# Patient Record
Sex: MALE | Race: WHITE | NOT HISPANIC OR LATINO | Employment: FULL TIME | URBAN - METROPOLITAN AREA
[De-identification: names, ages, dates, MRNs, and addresses within clinical notes are randomized per-mention and may not be internally consistent; named-entity substitution may affect disease eponyms.]

---

## 2023-07-31 ENCOUNTER — APPOINTMENT (OUTPATIENT)
Dept: PRIMARY CARE | Facility: CLINIC | Age: 63
End: 2023-07-31
Payer: COMMERCIAL

## 2023-08-07 ENCOUNTER — OFFICE VISIT (OUTPATIENT)
Dept: PRIMARY CARE | Facility: CLINIC | Age: 63
End: 2023-08-07
Payer: COMMERCIAL

## 2023-08-07 VITALS
HEIGHT: 71 IN | RESPIRATION RATE: 16 BRPM | HEART RATE: 77 BPM | SYSTOLIC BLOOD PRESSURE: 136 MMHG | DIASTOLIC BLOOD PRESSURE: 90 MMHG | WEIGHT: 236.7 LBS | TEMPERATURE: 98 F | OXYGEN SATURATION: 98 % | BODY MASS INDEX: 33.14 KG/M2

## 2023-08-07 DIAGNOSIS — E55.9 VITAMIN D DEFICIENCY: ICD-10-CM

## 2023-08-07 DIAGNOSIS — Z00.00 MEDICARE ANNUAL WELLNESS VISIT, INITIAL: Primary | ICD-10-CM

## 2023-08-07 DIAGNOSIS — E11.9 TYPE 2 DIABETES MELLITUS WITHOUT COMPLICATION, WITHOUT LONG-TERM CURRENT USE OF INSULIN (MULTI): ICD-10-CM

## 2023-08-07 DIAGNOSIS — R97.20 ELEVATED PSA: ICD-10-CM

## 2023-08-07 DIAGNOSIS — Z23 NEED FOR VACCINATION: ICD-10-CM

## 2023-08-07 DIAGNOSIS — Z12.11 SCREENING FOR COLORECTAL CANCER: ICD-10-CM

## 2023-08-07 DIAGNOSIS — Z12.12 SCREENING FOR COLORECTAL CANCER: ICD-10-CM

## 2023-08-07 DIAGNOSIS — Z00.00 ROUTINE GENERAL MEDICAL EXAMINATION AT HEALTH CARE FACILITY: ICD-10-CM

## 2023-08-07 DIAGNOSIS — R35.0 BENIGN PROSTATIC HYPERPLASIA WITH URINARY FREQUENCY: ICD-10-CM

## 2023-08-07 DIAGNOSIS — N40.1 BENIGN PROSTATIC HYPERPLASIA WITH URINARY FREQUENCY: ICD-10-CM

## 2023-08-07 PROBLEM — R73.9 BLOOD GLUCOSE ELEVATED: Status: ACTIVE | Noted: 2023-08-07

## 2023-08-07 PROBLEM — R73.9 HYPERGLYCEMIA: Status: ACTIVE | Noted: 2023-08-07

## 2023-08-07 PROBLEM — M17.11 PRIMARY OSTEOARTHRITIS OF RIGHT KNEE: Status: ACTIVE | Noted: 2023-08-07

## 2023-08-07 PROCEDURE — 3046F HEMOGLOBIN A1C LEVEL >9.0%: CPT | Performed by: INTERNAL MEDICINE

## 2023-08-07 PROCEDURE — 90472 IMMUNIZATION ADMIN EACH ADD: CPT | Performed by: INTERNAL MEDICINE

## 2023-08-07 PROCEDURE — 3080F DIAST BP >= 90 MM HG: CPT | Performed by: INTERNAL MEDICINE

## 2023-08-07 PROCEDURE — 3075F SYST BP GE 130 - 139MM HG: CPT | Performed by: INTERNAL MEDICINE

## 2023-08-07 PROCEDURE — G0402 INITIAL PREVENTIVE EXAM: HCPCS | Performed by: INTERNAL MEDICINE

## 2023-08-07 PROCEDURE — 1036F TOBACCO NON-USER: CPT | Performed by: INTERNAL MEDICINE

## 2023-08-07 PROCEDURE — 1170F FXNL STATUS ASSESSED: CPT | Performed by: INTERNAL MEDICINE

## 2023-08-07 PROCEDURE — 90471 IMMUNIZATION ADMIN: CPT | Performed by: INTERNAL MEDICINE

## 2023-08-07 PROCEDURE — 1159F MED LIST DOCD IN RCRD: CPT | Performed by: INTERNAL MEDICINE

## 2023-08-07 PROCEDURE — 90677 PCV20 VACCINE IM: CPT | Performed by: INTERNAL MEDICINE

## 2023-08-07 PROCEDURE — 90715 TDAP VACCINE 7 YRS/> IM: CPT | Performed by: INTERNAL MEDICINE

## 2023-08-07 RX ORDER — BLOOD SUGAR DIAGNOSTIC
STRIP MISCELLANEOUS
COMMUNITY
End: 2023-11-10 | Stop reason: SDUPTHER

## 2023-08-07 RX ORDER — LANCETS
EACH MISCELLANEOUS
COMMUNITY
Start: 2022-12-15 | End: 2024-02-26 | Stop reason: SDUPTHER

## 2023-08-07 RX ORDER — TAMSULOSIN HYDROCHLORIDE 0.4 MG/1
0.4 CAPSULE ORAL DAILY
Qty: 30 CAPSULE | Refills: 11 | Status: SHIPPED | OUTPATIENT
Start: 2023-08-07 | End: 2023-08-09

## 2023-08-07 RX ORDER — BLOOD-GLUCOSE METER
EACH MISCELLANEOUS
COMMUNITY
Start: 2022-09-19

## 2023-08-07 ASSESSMENT — PATIENT HEALTH QUESTIONNAIRE - PHQ9
1. LITTLE INTEREST OR PLEASURE IN DOING THINGS: NOT AT ALL
2. FEELING DOWN, DEPRESSED OR HOPELESS: NOT AT ALL
SUM OF ALL RESPONSES TO PHQ9 QUESTIONS 1 AND 2: 0

## 2023-08-07 ASSESSMENT — ENCOUNTER SYMPTOMS
OCCASIONAL FEELINGS OF UNSTEADINESS: 0
DEPRESSION: 0
LOSS OF SENSATION IN FEET: 0

## 2023-08-07 ASSESSMENT — ACTIVITIES OF DAILY LIVING (ADL)
BATHING: INDEPENDENT
MANAGING_FINANCES: INDEPENDENT
DOING_HOUSEWORK: INDEPENDENT
DRESSING: INDEPENDENT
TAKING_MEDICATION: INDEPENDENT
GROCERY_SHOPPING: INDEPENDENT

## 2023-08-07 NOTE — PROGRESS NOTES
Subjective   Patient ID: Shoaib Cheng is a 63 y.o. male who presents for Medicare Annual Wellness Visit Initial.     HAVE SOME DIABETES THING TALKED ABOUT IN THE PAST, CHANGED DIET AND LOST WEIGHT    ONLY ISSUE IS THAT SOME NIGHTS GET UP FREQUENTLY TO URINATE.    SOME TIMES THE FLOW.  NOT SEEING A UROLOGIST ANY MORE.  URINARY FREQUENCY WHEN STANDING UP.  DON'T TAKE SILDENAFIL ANY MORE                Review of Systems   Constitutional:  Negative for chills, diaphoresis and fever.   Respiratory:  Negative for cough and shortness of breath.    Cardiovascular:  Negative for chest pain and leg swelling.   Gastrointestinal:  Negative for constipation, diarrhea, nausea and vomiting.   Musculoskeletal:  Negative for joint swelling and myalgias.   All other systems reviewed and are negative.      Objective   There were no vitals taken for this visit.    Physical Exam  Vitals reviewed.   Constitutional:       General: He is not in acute distress.     Appearance: He is not ill-appearing.   Cardiovascular:      Rate and Rhythm: Normal rate and regular rhythm.      Pulses: Normal pulses.      Heart sounds:      No gallop.   Pulmonary:      Breath sounds: Normal breath sounds. No wheezing, rhonchi or rales.   Abdominal:      General: Abdomen is flat. Bowel sounds are normal.      Palpations: Abdomen is soft.      Tenderness: There is no guarding or rebound.   Musculoskeletal:      Right lower leg: No edema.      Left lower leg: No edema.         Assessment/Plan   Problem List Items Addressed This Visit       Elevated PSA    Relevant Orders    PSA, total and free    Type 2 diabetes mellitus without complication, without long-term current use of insulin (CMS/[facility])    Relevant Orders    Vitamin B12 (Completed)    Lipid Panel (Completed)    TSH with reflex to Free T4 if abnormal (Completed)    Comprehensive Metabolic Panel (Completed)    CBC (Completed)    Hemoglobin A1C (Completed)     Other Visit Diagnoses       Medicare annual  wellness visit, initial    -  Primary    Relevant Orders    Hepatitis C Antibody (Completed)    Pneumococcal conjugate vaccine, 20-valent, adult (PREVNAR 20) (Completed)    Tdap vaccine, age 10 years and older  (BOOSTRIX) (Completed)    Benign prostatic hyperplasia with urinary frequency        Relevant Medications    tamsulosin (Flomax) 0.4 mg 24 hr capsule    Vitamin D deficiency        Relevant Orders    Vitamin D, Total (Completed)    Need for vaccination        Screening for colorectal cancer        Relevant Orders    Colonoscopy    Routine general medical examination at health care facility        Relevant Orders    Pneumococcal conjugate vaccine, 20-valent, adult (PREVNAR 20) (Completed)    Tdap vaccine, age 10 years and older  (BOOSTRIX) (Completed)          Patient Instructions    PREVNAR-20 PNEUMONIA IMMUNIZATION AND TETANUS/PERTUSSIS IMMUNIZATIONS ARE ADMINISTERED TO YOU TODAY    2.  SHINGRIX IMMUNIZATION SCRIPT PRINTED TO TAKE TO THE PHARMACY TO GET THAT    3.  COLONSOCOPY REFERRAL IS MADE FOR YOU    4.  TAMSULOSIN 0.4 MG DAILY FOR SYMPTOMS OF PROSTATE ENLARGEMENT, PLEASE USE CAUTIOUSLY WITH SILDENAFIL    5.  REGULAR DENTAL AND EYE CHECKS AS YOU ARE DOING    6.  A1C TO BE DONE WITH BLOOD TEST WILL GUAGE HOW WELL YOU ARE DOING WITH DIABETES    7.  6 MONTH FOLLOW UP FOR DIABETES IS RECOMMENDED    8.  REGULAR DIET, EXERCISE, AND WEIGHT LOSS IS RECOMMENDED FOR YOU AS FOR ALL

## 2023-08-08 ENCOUNTER — LAB (OUTPATIENT)
Dept: LAB | Facility: LAB | Age: 63
End: 2023-08-08
Payer: COMMERCIAL

## 2023-08-08 DIAGNOSIS — E55.9 VITAMIN D DEFICIENCY: ICD-10-CM

## 2023-08-08 DIAGNOSIS — E11.9 TYPE 2 DIABETES MELLITUS WITHOUT COMPLICATION, WITHOUT LONG-TERM CURRENT USE OF INSULIN (MULTI): ICD-10-CM

## 2023-08-08 DIAGNOSIS — Z00.00 MEDICARE ANNUAL WELLNESS VISIT, INITIAL: ICD-10-CM

## 2023-08-08 DIAGNOSIS — R97.20 ELEVATED PSA: ICD-10-CM

## 2023-08-08 LAB
ALANINE AMINOTRANSFERASE (SGPT) (U/L) IN SER/PLAS: 18 U/L (ref 10–52)
ALBUMIN (G/DL) IN SER/PLAS: 4.2 G/DL (ref 3.4–5)
ALKALINE PHOSPHATASE (U/L) IN SER/PLAS: 76 U/L (ref 33–136)
ANION GAP IN SER/PLAS: 12 MMOL/L (ref 10–20)
ASPARTATE AMINOTRANSFERASE (SGOT) (U/L) IN SER/PLAS: 12 U/L (ref 9–39)
BILIRUBIN TOTAL (MG/DL) IN SER/PLAS: 0.4 MG/DL (ref 0–1.2)
CALCIDIOL (25 OH VITAMIN D3) (NG/ML) IN SER/PLAS: 26 NG/ML
CALCIUM (MG/DL) IN SER/PLAS: 9.4 MG/DL (ref 8.6–10.3)
CARBON DIOXIDE, TOTAL (MMOL/L) IN SER/PLAS: 26 MMOL/L (ref 21–32)
CHLORIDE (MMOL/L) IN SER/PLAS: 103 MMOL/L (ref 98–107)
CHOLESTEROL (MG/DL) IN SER/PLAS: 209 MG/DL (ref 0–199)
CHOLESTEROL IN HDL (MG/DL) IN SER/PLAS: 35.1 MG/DL
CHOLESTEROL/HDL RATIO: 6
COBALAMIN (VITAMIN B12) (PG/ML) IN SER/PLAS: 219 PG/ML (ref 211–911)
CREATININE (MG/DL) IN SER/PLAS: 1.06 MG/DL (ref 0.5–1.3)
ERYTHROCYTE DISTRIBUTION WIDTH (RATIO) BY AUTOMATED COUNT: 12.1 % (ref 11.5–14.5)
ERYTHROCYTE MEAN CORPUSCULAR HEMOGLOBIN CONCENTRATION (G/DL) BY AUTOMATED: 33.6 G/DL (ref 32–36)
ERYTHROCYTE MEAN CORPUSCULAR VOLUME (FL) BY AUTOMATED COUNT: 93 FL (ref 80–100)
ERYTHROCYTES (10*6/UL) IN BLOOD BY AUTOMATED COUNT: 4.67 X10E12/L (ref 4.5–5.9)
ESTIMATED AVERAGE GLUCOSE FOR HBA1C: 240 MG/DL
GFR MALE: 78 ML/MIN/1.73M2
GLUCOSE (MG/DL) IN SER/PLAS: 273 MG/DL (ref 74–99)
HEMATOCRIT (%) IN BLOOD BY AUTOMATED COUNT: 43.4 % (ref 41–52)
HEMOGLOBIN (G/DL) IN BLOOD: 14.6 G/DL (ref 13.5–17.5)
HEMOGLOBIN A1C/HEMOGLOBIN TOTAL IN BLOOD: 10 %
HEPATITIS C VIRUS AB PRESENCE IN SERUM: NONREACTIVE
LDL: 117 MG/DL (ref 0–99)
LEUKOCYTES (10*3/UL) IN BLOOD BY AUTOMATED COUNT: 10.9 X10E9/L (ref 4.4–11.3)
NON HDL CHOLESTEROL: 174 MG/DL
PLATELETS (10*3/UL) IN BLOOD AUTOMATED COUNT: 251 X10E9/L (ref 150–450)
POTASSIUM (MMOL/L) IN SER/PLAS: 4.2 MMOL/L (ref 3.5–5.3)
PROTEIN TOTAL: 7.1 G/DL (ref 6.4–8.2)
SODIUM (MMOL/L) IN SER/PLAS: 137 MMOL/L (ref 136–145)
THYROTROPIN (MIU/L) IN SER/PLAS BY DETECTION LIMIT <= 0.05 MIU/L: 2.23 MIU/L (ref 0.44–3.98)
TRIGLYCERIDE (MG/DL) IN SER/PLAS: 286 MG/DL (ref 0–149)
UREA NITROGEN (MG/DL) IN SER/PLAS: 22 MG/DL (ref 6–23)
VLDL: 57 MG/DL (ref 0–40)

## 2023-08-08 PROCEDURE — 84154 ASSAY OF PSA FREE: CPT

## 2023-08-08 PROCEDURE — 80053 COMPREHEN METABOLIC PANEL: CPT

## 2023-08-08 PROCEDURE — 83036 HEMOGLOBIN GLYCOSYLATED A1C: CPT

## 2023-08-08 PROCEDURE — 85027 COMPLETE CBC AUTOMATED: CPT

## 2023-08-08 PROCEDURE — 84443 ASSAY THYROID STIM HORMONE: CPT

## 2023-08-08 PROCEDURE — 82607 VITAMIN B-12: CPT

## 2023-08-08 PROCEDURE — 82306 VITAMIN D 25 HYDROXY: CPT

## 2023-08-08 PROCEDURE — 36415 COLL VENOUS BLD VENIPUNCTURE: CPT

## 2023-08-08 PROCEDURE — 80061 LIPID PANEL: CPT

## 2023-08-08 PROCEDURE — 86803 HEPATITIS C AB TEST: CPT

## 2023-08-08 PROCEDURE — 84153 ASSAY OF PSA TOTAL: CPT

## 2023-08-09 DIAGNOSIS — R35.0 BENIGN PROSTATIC HYPERPLASIA WITH URINARY FREQUENCY: ICD-10-CM

## 2023-08-09 DIAGNOSIS — N40.1 BENIGN PROSTATIC HYPERPLASIA WITH URINARY FREQUENCY: ICD-10-CM

## 2023-08-09 RX ORDER — TAMSULOSIN HYDROCHLORIDE 0.4 MG/1
0.4 CAPSULE ORAL DAILY
Qty: 90 CAPSULE | Refills: 11 | Status: ON HOLD | OUTPATIENT
Start: 2023-08-09 | End: 2023-10-17

## 2023-08-09 ASSESSMENT — ENCOUNTER SYMPTOMS
CONSTIPATION: 0
VOMITING: 0
JOINT SWELLING: 0
CHILLS: 0
COUGH: 0
DIAPHORESIS: 0
SHORTNESS OF BREATH: 0
NAUSEA: 0
MYALGIAS: 0
FEVER: 0
DIARRHEA: 0

## 2023-08-11 LAB
PROSTATE SPECIFIC AG (NG/ML) IN SER/PLAS: 7.6 NG/ML (ref 0–4)
PROSTATE SPECIFIC AG FREE (NG/ML) IN SER/PLAS: 1.4 NG/ML
PROSTATE SPECIFIC AG FREE/PROSTATE SPECIFIC AG TOTAL IN SER/PLAS: 18 %

## 2023-08-22 DIAGNOSIS — E11.9 TYPE 2 DIABETES MELLITUS WITHOUT COMPLICATION, WITHOUT LONG-TERM CURRENT USE OF INSULIN (MULTI): Primary | ICD-10-CM

## 2023-08-24 ENCOUNTER — OFFICE VISIT (OUTPATIENT)
Dept: PRIMARY CARE | Facility: CLINIC | Age: 63
End: 2023-08-24
Payer: COMMERCIAL

## 2023-08-24 VITALS
SYSTOLIC BLOOD PRESSURE: 122 MMHG | WEIGHT: 229 LBS | DIASTOLIC BLOOD PRESSURE: 80 MMHG | HEIGHT: 71 IN | RESPIRATION RATE: 14 BRPM | OXYGEN SATURATION: 98 % | BODY MASS INDEX: 32.06 KG/M2 | HEART RATE: 75 BPM

## 2023-08-24 DIAGNOSIS — R97.20 ELEVATED PSA: ICD-10-CM

## 2023-08-24 DIAGNOSIS — E11.9 TYPE 2 DIABETES MELLITUS WITHOUT COMPLICATION, WITHOUT LONG-TERM CURRENT USE OF INSULIN (MULTI): Primary | ICD-10-CM

## 2023-08-24 PROCEDURE — 99214 OFFICE O/P EST MOD 30 MIN: CPT | Performed by: INTERNAL MEDICINE

## 2023-08-24 PROCEDURE — 1159F MED LIST DOCD IN RCRD: CPT | Performed by: INTERNAL MEDICINE

## 2023-08-24 PROCEDURE — 1036F TOBACCO NON-USER: CPT | Performed by: INTERNAL MEDICINE

## 2023-08-24 PROCEDURE — 3079F DIAST BP 80-89 MM HG: CPT | Performed by: INTERNAL MEDICINE

## 2023-08-24 PROCEDURE — 3074F SYST BP LT 130 MM HG: CPT | Performed by: INTERNAL MEDICINE

## 2023-08-24 PROCEDURE — 3046F HEMOGLOBIN A1C LEVEL >9.0%: CPT | Performed by: INTERNAL MEDICINE

## 2023-08-24 RX ORDER — METFORMIN HYDROCHLORIDE 750 MG/1
750 TABLET, EXTENDED RELEASE ORAL
Qty: 30 TABLET | Refills: 11 | Status: SHIPPED | OUTPATIENT
Start: 2023-08-24 | End: 2023-10-19 | Stop reason: HOSPADM

## 2023-08-24 ASSESSMENT — ENCOUNTER SYMPTOMS
NAUSEA: 0
CONSTIPATION: 0
FEVER: 0
COUGH: 0
MYALGIAS: 0
CHILLS: 0
JOINT SWELLING: 0
DIARRHEA: 0
VOMITING: 0
SHORTNESS OF BREATH: 0
DIAPHORESIS: 0

## 2023-08-24 NOTE — PATIENT INSTRUCTIONS
WILL BEGIN METFORMIN  MG DAILY TO ASSIST IN LOWERING YOU A1C    2.  DIET/EXERCISE/WEIGHT LOSS ALONG WITH THE METFORMIN SHOULD HELP YOU LOWER A1C TO THE GOAL WHICH IS <7.0%.  A1C IS CURRENTLY 10.0% WHICH TRANSLATES TO AN ESTIMATED AVERAGE GLUCOSE     3.  RECOMMEND THAT YOU WAIT UNTIL A1C IS CLOSER TO 7.0 BEFORE YOU GET YOUR EYES REFRACTED BY THE EYE DOCTOR FOR GLASSES    4.  CONTINUOUS GLUCOSE MONITORS ARE CURRENTLY RELEGATED FOR THOSE TAKING INSULIN REGULARLY, BUT IF YOU'D LIKE TO PURCHASE ONE AND NEED A SCRIPT I CAN SEND IN FOR YOU    5.  PLEASE CALL IF YOU WOULD LIKE DIETICIAN CONSULT    6.  FOLLOW UP 4-6 MONTHS FOR RECHECK A1C    7.  EVENTUALLY WILL NEED ESTABLISH WITH UROLOGY FOR ONGOING MONITORING OF BPH WITH ELEVATED PSA.

## 2023-08-24 NOTE — PROGRESS NOTES
"Subjective   Shoaib Cheng is a 63 y.o. male who presents for DISCUSS ELEVATED A1c       HPI   BEEN WATCHING DIET SINCE FOUND OUT ABOUT A1C    LOST SOME WEIGHT    WIFE IS PARTICIPATING IN LIFESTYLE CHANGE    BOTH ARE VERY FAMILIAR WITH DIABETES AS THEIR SON IS A TYPE 1 DIABETIC    NO POLYURIA/POLYDIPSIA    QUESTION UTILITY OF A CGM IN HIS CASE?    HAS HAD TWO PROSTATE BIOPSIES NEGATIVE IN THE PAST.        Review of Systems   Constitutional:  Negative for chills, diaphoresis and fever.   Respiratory:  Negative for cough and shortness of breath.    Cardiovascular:  Negative for chest pain and leg swelling.   Gastrointestinal:  Negative for constipation, diarrhea, nausea and vomiting.   Musculoskeletal:  Negative for joint swelling and myalgias.       Objective   /80   Pulse 75   Resp 14   Ht 1.81 m (5' 11.26\")   Wt 104 kg (229 lb)   SpO2 98%   BMI 31.71 kg/m²     Physical Exam  Vitals reviewed.   Constitutional:       General: He is not in acute distress.     Appearance: He is not ill-appearing.   Cardiovascular:      Rate and Rhythm: Normal rate and regular rhythm.      Pulses: Normal pulses.      Heart sounds:      No gallop.   Pulmonary:      Breath sounds: Normal breath sounds. No wheezing, rhonchi or rales.   Abdominal:      General: Abdomen is flat. Bowel sounds are normal.      Palpations: Abdomen is soft.      Tenderness: There is no guarding or rebound.   Musculoskeletal:      Right lower leg: No edema.      Left lower leg: No edema.         Assessment/Plan   Problem List Items Addressed This Visit       Elevated PSA    Type 2 diabetes mellitus without complication, without long-term current use of insulin (CMS/[facility]) - Primary    Relevant Medications    metFORMIN XR (Glucophage-XR) 750 mg 24 hr tablet     Patient Instructions    WILL BEGIN METFORMIN  MG DAILY TO ASSIST IN LOWERING YOU A1C    2.  DIET/EXERCISE/WEIGHT LOSS ALONG WITH THE METFORMIN SHOULD HELP YOU LOWER A1C TO THE GOAL WHICH IS " <7.0%.  A1C IS CURRENTLY 10.0% WHICH TRANSLATES TO AN ESTIMATED AVERAGE GLUCOSE     3.  RECOMMEND THAT YOU WAIT UNTIL A1C IS CLOSER TO 7.0 BEFORE YOU GET YOUR EYES REFRACTED BY THE EYE DOCTOR FOR GLASSES    4.  CONTINUOUS GLUCOSE MONITORS ARE CURRENTLY RELEGATED FOR THOSE TAKING INSULIN REGULARLY, BUT IF YOU'D LIKE TO PURCHASE ONE AND NEED A SCRIPT I CAN SEND IN FOR YOU    5.  PLEASE CALL IF YOU WOULD LIKE DIETICIAN CONSULT    6.  FOLLOW UP 4-6 MONTHS FOR RECHECK A1C    7.  EVENTUALLY WILL NEED ESTABLISH WITH UROLOGY FOR ONGOING MONITORING OF BPH WITH ELEVATED PSA.

## 2023-10-10 ENCOUNTER — TELEPHONE (OUTPATIENT)
Dept: PRIMARY CARE | Facility: CLINIC | Age: 63
End: 2023-10-10
Payer: COMMERCIAL

## 2023-10-14 ENCOUNTER — HOSPITAL ENCOUNTER (OUTPATIENT)
Dept: RADIOLOGY | Facility: EXTERNAL LOCATION | Age: 63
Discharge: HOME | End: 2023-10-14
Payer: COMMERCIAL

## 2023-10-14 DIAGNOSIS — M79.662 PAIN OF LEFT LOWER LEG: ICD-10-CM

## 2023-10-16 ENCOUNTER — APPOINTMENT (OUTPATIENT)
Dept: RADIOLOGY | Facility: HOSPITAL | Age: 63
End: 2023-10-16
Payer: COMMERCIAL

## 2023-10-16 ENCOUNTER — HOSPITAL ENCOUNTER (INPATIENT)
Facility: HOSPITAL | Age: 63
LOS: 1 days | Discharge: HOME | DRG: 638 | End: 2023-10-19
Attending: EMERGENCY MEDICINE | Admitting: INTERNAL MEDICINE
Payer: COMMERCIAL

## 2023-10-16 ENCOUNTER — APPOINTMENT (OUTPATIENT)
Dept: RADIOLOGY | Facility: HOSPITAL | Age: 63
DRG: 638 | End: 2023-10-16
Payer: COMMERCIAL

## 2023-10-16 DIAGNOSIS — L03.116 CELLULITIS AND ABSCESS OF LEFT LEG: Primary | ICD-10-CM

## 2023-10-16 DIAGNOSIS — L02.416 ABSCESS OF LEFT LEG: ICD-10-CM

## 2023-10-16 DIAGNOSIS — R73.9 BLOOD GLUCOSE ELEVATED: ICD-10-CM

## 2023-10-16 DIAGNOSIS — E11.9 TYPE 2 DIABETES MELLITUS WITHOUT COMPLICATION, WITHOUT LONG-TERM CURRENT USE OF INSULIN (MULTI): ICD-10-CM

## 2023-10-16 DIAGNOSIS — R73.9 HYPERGLYCEMIA: ICD-10-CM

## 2023-10-16 DIAGNOSIS — L02.416 CELLULITIS AND ABSCESS OF LEFT LEG: Primary | ICD-10-CM

## 2023-10-16 DIAGNOSIS — F51.01 PRIMARY INSOMNIA: ICD-10-CM

## 2023-10-16 LAB
ALBUMIN SERPL BCP-MCNC: 3.9 G/DL (ref 3.4–5)
ALP SERPL-CCNC: 305 U/L (ref 33–136)
ALT SERPL W P-5'-P-CCNC: 67 U/L (ref 10–52)
ANION GAP SERPL CALC-SCNC: 13 MMOL/L (ref 10–20)
APTT PPP: 32 SECONDS (ref 27–38)
AST SERPL W P-5'-P-CCNC: 26 U/L (ref 9–39)
BASOPHILS # BLD AUTO: 0.17 X10*3/UL (ref 0–0.1)
BASOPHILS NFR BLD AUTO: 0.8 %
BILIRUB SERPL-MCNC: 0.7 MG/DL (ref 0–1.2)
BUN SERPL-MCNC: 17 MG/DL (ref 6–23)
CALCIUM SERPL-MCNC: 9 MG/DL (ref 8.6–10.3)
CHLORIDE SERPL-SCNC: 95 MMOL/L (ref 98–107)
CO2 SERPL-SCNC: 26 MMOL/L (ref 21–32)
CREAT SERPL-MCNC: 0.83 MG/DL (ref 0.5–1.3)
EOSINOPHIL # BLD AUTO: 0.23 X10*3/UL (ref 0–0.7)
EOSINOPHIL NFR BLD AUTO: 1.1 %
ERYTHROCYTE [DISTWIDTH] IN BLOOD BY AUTOMATED COUNT: 12.2 % (ref 11.5–14.5)
GFR SERPL CREATININE-BSD FRML MDRD: >90 ML/MIN/1.73M*2
GLUCOSE SERPL-MCNC: 367 MG/DL (ref 74–99)
HCT VFR BLD AUTO: 36.4 % (ref 41–52)
HGB BLD-MCNC: 12.8 G/DL (ref 13.5–17.5)
IMM GRANULOCYTES # BLD AUTO: 0.63 X10*3/UL (ref 0–0.7)
IMM GRANULOCYTES NFR BLD AUTO: 2.9 % (ref 0–0.9)
INR PPP: 1.4 (ref 0.9–1.1)
LACTATE SERPL-SCNC: 1.7 MMOL/L (ref 0.4–2)
LYMPHOCYTES # BLD AUTO: 2.46 X10*3/UL (ref 1.2–4.8)
LYMPHOCYTES NFR BLD AUTO: 11.4 %
MCH RBC QN AUTO: 31.5 PG (ref 26–34)
MCHC RBC AUTO-ENTMCNC: 35.2 G/DL (ref 32–36)
MCV RBC AUTO: 90 FL (ref 80–100)
MONOCYTES # BLD AUTO: 1.44 X10*3/UL (ref 0.1–1)
MONOCYTES NFR BLD AUTO: 6.7 %
NEUTROPHILS # BLD AUTO: 16.59 X10*3/UL (ref 1.2–7.7)
NEUTROPHILS NFR BLD AUTO: 77.1 %
NRBC BLD-RTO: 0 /100 WBCS (ref 0–0)
PLATELET # BLD AUTO: 395 X10*3/UL (ref 150–450)
PMV BLD AUTO: 9.4 FL (ref 7.5–11.5)
POTASSIUM SERPL-SCNC: 4.4 MMOL/L (ref 3.5–5.3)
PROT SERPL-MCNC: 7.4 G/DL (ref 6.4–8.2)
PROTHROMBIN TIME: 15.9 SECONDS (ref 9.8–12.8)
RBC # BLD AUTO: 4.06 X10*6/UL (ref 4.5–5.9)
SODIUM SERPL-SCNC: 130 MMOL/L (ref 136–145)
WBC # BLD AUTO: 21.5 X10*3/UL (ref 4.4–11.3)

## 2023-10-16 PROCEDURE — 85610 PROTHROMBIN TIME: CPT | Performed by: STUDENT IN AN ORGANIZED HEALTH CARE EDUCATION/TRAINING PROGRAM

## 2023-10-16 PROCEDURE — 87075 CULTR BACTERIA EXCEPT BLOOD: CPT | Mod: AHULAB,CMCLAB | Performed by: PHYSICIAN ASSISTANT

## 2023-10-16 PROCEDURE — 2580000001 HC RX 258 IV SOLUTIONS: Performed by: STUDENT IN AN ORGANIZED HEALTH CARE EDUCATION/TRAINING PROGRAM

## 2023-10-16 PROCEDURE — 99285 EMERGENCY DEPT VISIT HI MDM: CPT | Performed by: EMERGENCY MEDICINE

## 2023-10-16 PROCEDURE — 36415 COLL VENOUS BLD VENIPUNCTURE: CPT | Performed by: PHYSICIAN ASSISTANT

## 2023-10-16 PROCEDURE — 73701 CT LOWER EXTREMITY W/DYE: CPT | Mod: LT

## 2023-10-16 PROCEDURE — 96375 TX/PRO/DX INJ NEW DRUG ADDON: CPT

## 2023-10-16 PROCEDURE — 87075 CULTR BACTERIA EXCEPT BLOOD: CPT | Mod: AHULAB,CMCLAB | Performed by: EMERGENCY MEDICINE

## 2023-10-16 PROCEDURE — 96367 TX/PROPH/DG ADDL SEQ IV INF: CPT

## 2023-10-16 PROCEDURE — 93971 EXTREMITY STUDY: CPT | Performed by: STUDENT IN AN ORGANIZED HEALTH CARE EDUCATION/TRAINING PROGRAM

## 2023-10-16 PROCEDURE — 73701 CT LOWER EXTREMITY W/DYE: CPT | Mod: LEFT SIDE | Performed by: STUDENT IN AN ORGANIZED HEALTH CARE EDUCATION/TRAINING PROGRAM

## 2023-10-16 PROCEDURE — 2500000004 HC RX 250 GENERAL PHARMACY W/ HCPCS (ALT 636 FOR OP/ED): Performed by: EMERGENCY MEDICINE

## 2023-10-16 PROCEDURE — 2500000004 HC RX 250 GENERAL PHARMACY W/ HCPCS (ALT 636 FOR OP/ED)

## 2023-10-16 PROCEDURE — 80053 COMPREHEN METABOLIC PANEL: CPT | Performed by: PHYSICIAN ASSISTANT

## 2023-10-16 PROCEDURE — 85025 COMPLETE CBC W/AUTO DIFF WBC: CPT | Performed by: PHYSICIAN ASSISTANT

## 2023-10-16 PROCEDURE — 83605 ASSAY OF LACTIC ACID: CPT | Performed by: PHYSICIAN ASSISTANT

## 2023-10-16 PROCEDURE — 2500000005 HC RX 250 GENERAL PHARMACY W/O HCPCS

## 2023-10-16 PROCEDURE — 2500000004 HC RX 250 GENERAL PHARMACY W/ HCPCS (ALT 636 FOR OP/ED): Performed by: STUDENT IN AN ORGANIZED HEALTH CARE EDUCATION/TRAINING PROGRAM

## 2023-10-16 PROCEDURE — 96365 THER/PROPH/DIAG IV INF INIT: CPT

## 2023-10-16 PROCEDURE — 2500000004 HC RX 250 GENERAL PHARMACY W/ HCPCS (ALT 636 FOR OP/ED): Performed by: PHYSICIAN ASSISTANT

## 2023-10-16 PROCEDURE — 96372 THER/PROPH/DIAG INJ SC/IM: CPT | Mod: 59

## 2023-10-16 PROCEDURE — A4217 STERILE WATER/SALINE, 500 ML: HCPCS | Performed by: EMERGENCY MEDICINE

## 2023-10-16 PROCEDURE — 96366 THER/PROPH/DIAG IV INF ADDON: CPT

## 2023-10-16 PROCEDURE — 96361 HYDRATE IV INFUSION ADD-ON: CPT

## 2023-10-16 PROCEDURE — 93971 EXTREMITY STUDY: CPT

## 2023-10-16 PROCEDURE — 0H9LXZZ DRAINAGE OF LEFT LOWER LEG SKIN, EXTERNAL APPROACH: ICD-10-PCS | Performed by: PODIATRIST

## 2023-10-16 RX ORDER — ONDANSETRON HYDROCHLORIDE 2 MG/ML
4 INJECTION, SOLUTION INTRAVENOUS ONCE
Status: COMPLETED | OUTPATIENT
Start: 2023-10-16 | End: 2023-10-16

## 2023-10-16 RX ORDER — MORPHINE SULFATE 4 MG/ML
4 INJECTION, SOLUTION INTRAMUSCULAR; INTRAVENOUS EVERY 4 HOURS PRN
Status: DISCONTINUED | OUTPATIENT
Start: 2023-10-16 | End: 2023-10-17

## 2023-10-16 RX ORDER — VANCOMYCIN 2 GRAM/500 ML IN 0.9 % SODIUM CHLORIDE INTRAVENOUS
2 ONCE
Status: DISCONTINUED | OUTPATIENT
Start: 2023-10-16 | End: 2023-10-16

## 2023-10-16 RX ORDER — LIDOCAINE HYDROCHLORIDE 10 MG/ML
INJECTION INFILTRATION; PERINEURAL
Status: DISPENSED
Start: 2023-10-16 | End: 2023-10-17

## 2023-10-16 RX ORDER — LIDOCAINE HYDROCHLORIDE 10 MG/ML
INJECTION INFILTRATION; PERINEURAL
Status: COMPLETED
Start: 2023-10-16 | End: 2023-10-16

## 2023-10-16 RX ORDER — MORPHINE SULFATE 2 MG/ML
2 INJECTION, SOLUTION INTRAMUSCULAR; INTRAVENOUS EVERY 4 HOURS PRN
Status: DISCONTINUED | OUTPATIENT
Start: 2023-10-16 | End: 2023-10-17

## 2023-10-16 RX ORDER — LIDOCAINE HYDROCHLORIDE 10 MG/ML
20 INJECTION INFILTRATION; PERINEURAL ONCE
Status: COMPLETED | OUTPATIENT
Start: 2023-10-16 | End: 2023-10-16

## 2023-10-16 RX ORDER — ONDANSETRON HYDROCHLORIDE 2 MG/ML
INJECTION, SOLUTION INTRAVENOUS
Status: COMPLETED
Start: 2023-10-16 | End: 2023-10-16

## 2023-10-16 RX ORDER — MORPHINE SULFATE 4 MG/ML
4 INJECTION, SOLUTION INTRAMUSCULAR; INTRAVENOUS ONCE
Status: COMPLETED | OUTPATIENT
Start: 2023-10-16 | End: 2023-10-16

## 2023-10-16 RX ORDER — ACETAMINOPHEN 325 MG/1
650 TABLET ORAL EVERY 4 HOURS PRN
Status: DISCONTINUED | OUTPATIENT
Start: 2023-10-16 | End: 2023-10-17

## 2023-10-16 RX ADMIN — ONDANSETRON 4 MG: 2 INJECTION INTRAMUSCULAR; INTRAVENOUS at 20:37

## 2023-10-16 RX ADMIN — ONDANSETRON HYDROCHLORIDE 4 MG: 2 INJECTION, SOLUTION INTRAVENOUS at 20:37

## 2023-10-16 RX ADMIN — SODIUM CHLORIDE, POTASSIUM CHLORIDE, SODIUM LACTATE AND CALCIUM CHLORIDE 1000 ML: 600; 310; 30; 20 INJECTION, SOLUTION INTRAVENOUS at 20:15

## 2023-10-16 RX ADMIN — PIPERACILLIN SODIUM AND TAZOBACTAM SODIUM 4.5 G: 4; .5 INJECTION, SOLUTION INTRAVENOUS at 20:15

## 2023-10-16 RX ADMIN — VANCOMYCIN HYDROCHLORIDE 1500 MG: 10 INJECTION, POWDER, LYOPHILIZED, FOR SOLUTION INTRAVENOUS at 22:25

## 2023-10-16 RX ADMIN — LIDOCAINE HYDROCHLORIDE 200 MG: 10 INJECTION, SOLUTION INFILTRATION; PERINEURAL at 20:38

## 2023-10-16 RX ADMIN — MORPHINE SULFATE 4 MG: 4 INJECTION, SOLUTION INTRAMUSCULAR; INTRAVENOUS at 20:15

## 2023-10-16 RX ADMIN — LIDOCAINE HYDROCHLORIDE 200 MG: 10 INJECTION INFILTRATION; PERINEURAL at 20:38

## 2023-10-16 ASSESSMENT — PAIN - FUNCTIONAL ASSESSMENT: PAIN_FUNCTIONAL_ASSESSMENT: 0-10

## 2023-10-16 ASSESSMENT — PAIN DESCRIPTION - ORIENTATION: ORIENTATION: LEFT

## 2023-10-16 ASSESSMENT — COLUMBIA-SUICIDE SEVERITY RATING SCALE - C-SSRS
1. IN THE PAST MONTH, HAVE YOU WISHED YOU WERE DEAD OR WISHED YOU COULD GO TO SLEEP AND NOT WAKE UP?: NO
6. HAVE YOU EVER DONE ANYTHING, STARTED TO DO ANYTHING, OR PREPARED TO DO ANYTHING TO END YOUR LIFE?: NO
2. HAVE YOU ACTUALLY HAD ANY THOUGHTS OF KILLING YOURSELF?: NO

## 2023-10-16 ASSESSMENT — PAIN SCALES - GENERAL
PAINLEVEL_OUTOF10: 10 - WORST POSSIBLE PAIN
PAINLEVEL_OUTOF10: 5 - MODERATE PAIN

## 2023-10-16 ASSESSMENT — PAIN DESCRIPTION - LOCATION: LOCATION: LEG

## 2023-10-16 NOTE — TELEPHONE ENCOUNTER
10/16/23  Called to have patient come in after Dr. Garcia reviewed xray.  Thinks patient may have hematoma pressing on vital structures.  Spouse was just about to take patient to ER, said patient was in horrific pain.  Dr. Garcia agreed.

## 2023-10-16 NOTE — ED TRIAGE NOTES
Pt arrives via triage with complaint of LLE edema/drainage. Pt st was in [place] in swimming pool when scraped left leg in pool, was seen at urgent care and Rx ATB. St has now gotten worse. Pt arrives with edema to the left leg, yellowish drainage, and reddened skin. Vitals WNL, st area is very painful. Concern for abscess/infection.

## 2023-10-16 NOTE — ED TRIAGE NOTES
As provider-in-triage, I performed a medical screening history and physical exam on this patient.  HISTORY OF PRESENT ILLNESS  (include at least one item)     L leg abscess and cellulitis, failed cephalexin. No fevers, recently diabetic. Started with scraping his leg in [place] in a pool. Getting worse, felt mass in L leg, no other complaints. we    PHYSICAL EXAM  Vital Signs reviewed.  (include at least one additional item)     L shin abscess and cellulitis, palpable area in L upper thigh    MDM  (describe briefly what was initiated or planned)    Sepsis workup, abx      I evaluated this patient in triage with the RN. Due to the patients complaint labs and or imaging were ordered by myself in an attempt to expedite patient care however I am not participating in care after evaluation. This is a preliminary assessment. Pt does not appear in acute distress at this time. They are stable and will have a full evaluation as soon as possible. They will be cared for by another provider who will possibly order more labs, imaging or interventions. Pt did not have a full ROS or PE completed by myself however below is a summary with reasons for orders.

## 2023-10-17 PROBLEM — L02.416 ABSCESS OF LEFT LEG: Status: ACTIVE | Noted: 2023-10-17

## 2023-10-17 LAB
ALBUMIN SERPL BCP-MCNC: 3.2 G/DL (ref 3.4–5)
ALP SERPL-CCNC: 255 U/L (ref 33–136)
ALT SERPL W P-5'-P-CCNC: 53 U/L (ref 10–52)
ANION GAP SERPL CALC-SCNC: 13 MMOL/L (ref 10–20)
APPEARANCE UR: CLEAR
AST SERPL W P-5'-P-CCNC: 20 U/L (ref 9–39)
BASOPHILS # BLD AUTO: 0.16 X10*3/UL (ref 0–0.1)
BASOPHILS NFR BLD AUTO: 0.8 %
BILIRUB SERPL-MCNC: 0.8 MG/DL (ref 0–1.2)
BILIRUB UR STRIP.AUTO-MCNC: NEGATIVE MG/DL
BUN SERPL-MCNC: 16 MG/DL (ref 6–23)
CALCIUM SERPL-MCNC: 8.5 MG/DL (ref 8.6–10.3)
CHLORIDE SERPL-SCNC: 96 MMOL/L (ref 98–107)
CO2 SERPL-SCNC: 27 MMOL/L (ref 21–32)
COLOR UR: YELLOW
CREAT SERPL-MCNC: 0.83 MG/DL (ref 0.5–1.3)
EOSINOPHIL # BLD AUTO: 0.25 X10*3/UL (ref 0–0.7)
EOSINOPHIL NFR BLD AUTO: 1.3 %
ERYTHROCYTE [DISTWIDTH] IN BLOOD BY AUTOMATED COUNT: 12.4 % (ref 11.5–14.5)
GFR SERPL CREATININE-BSD FRML MDRD: >90 ML/MIN/1.73M*2
GLUCOSE BLD MANUAL STRIP-MCNC: 209 MG/DL (ref 74–99)
GLUCOSE BLD MANUAL STRIP-MCNC: 259 MG/DL (ref 74–99)
GLUCOSE BLD MANUAL STRIP-MCNC: 292 MG/DL (ref 74–99)
GLUCOSE BLD MANUAL STRIP-MCNC: 333 MG/DL (ref 74–99)
GLUCOSE SERPL-MCNC: 319 MG/DL (ref 74–99)
GLUCOSE UR STRIP.AUTO-MCNC: ABNORMAL MG/DL
HCT VFR BLD AUTO: 33.7 % (ref 41–52)
HGB BLD-MCNC: 11.4 G/DL (ref 13.5–17.5)
IMM GRANULOCYTES # BLD AUTO: 0.47 X10*3/UL (ref 0–0.7)
IMM GRANULOCYTES NFR BLD AUTO: 2.4 % (ref 0–0.9)
KETONES UR STRIP.AUTO-MCNC: NEGATIVE MG/DL
LEUKOCYTE ESTERASE UR QL STRIP.AUTO: NEGATIVE
LYMPHOCYTES # BLD AUTO: 2.75 X10*3/UL (ref 1.2–4.8)
LYMPHOCYTES NFR BLD AUTO: 14 %
MCH RBC QN AUTO: 31 PG (ref 26–34)
MCHC RBC AUTO-ENTMCNC: 33.8 G/DL (ref 32–36)
MCV RBC AUTO: 92 FL (ref 80–100)
MONOCYTES # BLD AUTO: 1.49 X10*3/UL (ref 0.1–1)
MONOCYTES NFR BLD AUTO: 7.6 %
NEUTROPHILS # BLD AUTO: 14.5 X10*3/UL (ref 1.2–7.7)
NEUTROPHILS NFR BLD AUTO: 73.9 %
NITRITE UR QL STRIP.AUTO: NEGATIVE
NRBC BLD-RTO: 0 /100 WBCS (ref 0–0)
PH UR STRIP.AUTO: 6 [PH]
PLATELET # BLD AUTO: 345 X10*3/UL (ref 150–450)
PMV BLD AUTO: 9.4 FL (ref 7.5–11.5)
POTASSIUM SERPL-SCNC: 4.2 MMOL/L (ref 3.5–5.3)
PROT SERPL-MCNC: 6.3 G/DL (ref 6.4–8.2)
PROT UR STRIP.AUTO-MCNC: NEGATIVE MG/DL
RBC # BLD AUTO: 3.68 X10*6/UL (ref 4.5–5.9)
RBC # UR STRIP.AUTO: NEGATIVE /UL
SODIUM SERPL-SCNC: 132 MMOL/L (ref 136–145)
SP GR UR STRIP.AUTO: 1.04
UROBILINOGEN UR STRIP.AUTO-MCNC: <2 MG/DL
WBC # BLD AUTO: 19.6 X10*3/UL (ref 4.4–11.3)

## 2023-10-17 PROCEDURE — 2500000004 HC RX 250 GENERAL PHARMACY W/ HCPCS (ALT 636 FOR OP/ED): Performed by: INTERNAL MEDICINE

## 2023-10-17 PROCEDURE — 99221 1ST HOSP IP/OBS SF/LOW 40: CPT | Performed by: PHYSICIAN ASSISTANT

## 2023-10-17 PROCEDURE — 2500000004 HC RX 250 GENERAL PHARMACY W/ HCPCS (ALT 636 FOR OP/ED): Performed by: NURSE PRACTITIONER

## 2023-10-17 PROCEDURE — 87075 CULTR BACTERIA EXCEPT BLOOD: CPT | Mod: AHULAB,CMCLAB

## 2023-10-17 PROCEDURE — 80053 COMPREHEN METABOLIC PANEL: CPT | Performed by: PHYSICIAN ASSISTANT

## 2023-10-17 PROCEDURE — 96372 THER/PROPH/DIAG INJ SC/IM: CPT | Performed by: PHYSICIAN ASSISTANT

## 2023-10-17 PROCEDURE — 10060 I&D ABSCESS SIMPLE/SINGLE: CPT | Performed by: PODIATRIST

## 2023-10-17 PROCEDURE — 36415 COLL VENOUS BLD VENIPUNCTURE: CPT | Performed by: PHYSICIAN ASSISTANT

## 2023-10-17 PROCEDURE — 10060 I&D ABSCESS SIMPLE/SINGLE: CPT | Performed by: INTERNAL MEDICINE

## 2023-10-17 PROCEDURE — G0378 HOSPITAL OBSERVATION PER HR: HCPCS

## 2023-10-17 PROCEDURE — 87070 CULTURE OTHR SPECIMN AEROBIC: CPT | Mod: AHULAB,CMCLAB

## 2023-10-17 PROCEDURE — 99222 1ST HOSP IP/OBS MODERATE 55: CPT

## 2023-10-17 PROCEDURE — 81003 URINALYSIS AUTO W/O SCOPE: CPT | Performed by: PHYSICIAN ASSISTANT

## 2023-10-17 PROCEDURE — A4217 STERILE WATER/SALINE, 500 ML: HCPCS | Performed by: PHYSICIAN ASSISTANT

## 2023-10-17 PROCEDURE — 2500000002 HC RX 250 W HCPCS SELF ADMINISTERED DRUGS (ALT 637 FOR MEDICARE OP, ALT 636 FOR OP/ED): Performed by: PHYSICIAN ASSISTANT

## 2023-10-17 PROCEDURE — 99232 SBSQ HOSP IP/OBS MODERATE 35: CPT | Performed by: NURSE PRACTITIONER

## 2023-10-17 PROCEDURE — 85025 COMPLETE CBC W/AUTO DIFF WBC: CPT | Performed by: PHYSICIAN ASSISTANT

## 2023-10-17 PROCEDURE — 2550000001 HC RX 255 CONTRASTS: Performed by: EMERGENCY MEDICINE

## 2023-10-17 PROCEDURE — 2500000004 HC RX 250 GENERAL PHARMACY W/ HCPCS (ALT 636 FOR OP/ED): Performed by: PHYSICIAN ASSISTANT

## 2023-10-17 PROCEDURE — 82947 ASSAY GLUCOSE BLOOD QUANT: CPT

## 2023-10-17 RX ORDER — INSULIN LISPRO 100 [IU]/ML
0-5 INJECTION, SOLUTION INTRAVENOUS; SUBCUTANEOUS
Status: DISCONTINUED | OUTPATIENT
Start: 2023-10-17 | End: 2023-10-17

## 2023-10-17 RX ORDER — DOCUSATE SODIUM 100 MG/1
100 CAPSULE, LIQUID FILLED ORAL 2 TIMES DAILY
Status: DISCONTINUED | OUTPATIENT
Start: 2023-10-17 | End: 2023-10-19 | Stop reason: HOSPADM

## 2023-10-17 RX ORDER — TAMSULOSIN HYDROCHLORIDE 0.4 MG/1
0.4 CAPSULE ORAL
COMMUNITY

## 2023-10-17 RX ORDER — TAMSULOSIN HYDROCHLORIDE 0.4 MG/1
0.4 CAPSULE ORAL
Status: DISCONTINUED | OUTPATIENT
Start: 2023-10-17 | End: 2023-10-19 | Stop reason: HOSPADM

## 2023-10-17 RX ORDER — ACETAMINOPHEN 325 MG/1
950 TABLET ORAL EVERY 6 HOURS PRN
Status: DISCONTINUED | OUTPATIENT
Start: 2023-10-17 | End: 2023-10-19 | Stop reason: HOSPADM

## 2023-10-17 RX ORDER — INSULIN LISPRO 100 [IU]/ML
0-10 INJECTION, SOLUTION INTRAVENOUS; SUBCUTANEOUS
Status: DISCONTINUED | OUTPATIENT
Start: 2023-10-17 | End: 2023-10-19 | Stop reason: HOSPADM

## 2023-10-17 RX ORDER — ENOXAPARIN SODIUM 100 MG/ML
40 INJECTION SUBCUTANEOUS EVERY 24 HOURS
Status: DISCONTINUED | OUTPATIENT
Start: 2023-10-17 | End: 2023-10-19 | Stop reason: HOSPADM

## 2023-10-17 RX ORDER — PANTOPRAZOLE SODIUM 40 MG/10ML
40 INJECTION, POWDER, LYOPHILIZED, FOR SOLUTION INTRAVENOUS
Status: DISCONTINUED | OUTPATIENT
Start: 2023-10-17 | End: 2023-10-19 | Stop reason: HOSPADM

## 2023-10-17 RX ORDER — DEXTROSE 50 % IN WATER (D50W) INTRAVENOUS SYRINGE
25
Status: DISCONTINUED | OUTPATIENT
Start: 2023-10-17 | End: 2023-10-19 | Stop reason: HOSPADM

## 2023-10-17 RX ORDER — MORPHINE SULFATE 2 MG/ML
2 INJECTION, SOLUTION INTRAMUSCULAR; INTRAVENOUS EVERY 4 HOURS PRN
Status: DISCONTINUED | OUTPATIENT
Start: 2023-10-17 | End: 2023-10-19 | Stop reason: HOSPADM

## 2023-10-17 RX ORDER — OXYCODONE HYDROCHLORIDE 5 MG/1
5 TABLET ORAL EVERY 6 HOURS PRN
Status: DISCONTINUED | OUTPATIENT
Start: 2023-10-17 | End: 2023-10-19 | Stop reason: HOSPADM

## 2023-10-17 RX ORDER — SILDENAFIL 50 MG/1
50 TABLET, FILM COATED ORAL DAILY PRN
COMMUNITY
End: 2024-01-19 | Stop reason: WASHOUT

## 2023-10-17 RX ORDER — PANTOPRAZOLE SODIUM 40 MG/1
40 TABLET, DELAYED RELEASE ORAL
Status: DISCONTINUED | OUTPATIENT
Start: 2023-10-17 | End: 2023-10-19 | Stop reason: HOSPADM

## 2023-10-17 RX ORDER — TALC
3 POWDER (GRAM) TOPICAL
Status: DISCONTINUED | OUTPATIENT
Start: 2023-10-17 | End: 2023-10-19 | Stop reason: HOSPADM

## 2023-10-17 RX ORDER — DEXTROSE MONOHYDRATE 100 MG/ML
0.3 INJECTION, SOLUTION INTRAVENOUS ONCE AS NEEDED
Status: DISCONTINUED | OUTPATIENT
Start: 2023-10-17 | End: 2023-10-19 | Stop reason: HOSPADM

## 2023-10-17 RX ADMIN — VANCOMYCIN HYDROCHLORIDE 1250 MG: 10 INJECTION, POWDER, LYOPHILIZED, FOR SOLUTION INTRAVENOUS at 15:02

## 2023-10-17 RX ADMIN — INSULIN LISPRO 8 UNITS: 100 INJECTION, SOLUTION INTRAVENOUS; SUBCUTANEOUS at 08:27

## 2023-10-17 RX ADMIN — ACETAMINOPHEN 975 MG: 325 TABLET ORAL at 22:48

## 2023-10-17 RX ADMIN — ACETAMINOPHEN 650 MG: 325 TABLET ORAL at 01:04

## 2023-10-17 RX ADMIN — INSULIN LISPRO 6 UNITS: 100 INJECTION, SOLUTION INTRAVENOUS; SUBCUTANEOUS at 13:19

## 2023-10-17 RX ADMIN — IOHEXOL 75 ML: 350 INJECTION, SOLUTION INTRAVENOUS at 00:10

## 2023-10-17 RX ADMIN — INSULIN LISPRO 4 UNITS: 100 INJECTION, SOLUTION INTRAVENOUS; SUBCUTANEOUS at 17:10

## 2023-10-17 RX ADMIN — PIPERACILLIN SODIUM AND TAZOBACTAM SODIUM 3.38 G: 3; .375 INJECTION, SOLUTION INTRAVENOUS at 22:51

## 2023-10-17 RX ADMIN — PIPERACILLIN SODIUM AND TAZOBACTAM SODIUM 4.5 G: 4; .5 INJECTION, SOLUTION INTRAVENOUS at 05:30

## 2023-10-17 RX ADMIN — ENOXAPARIN SODIUM 40 MG: 100 INJECTION SUBCUTANEOUS at 10:18

## 2023-10-17 RX ADMIN — TAMSULOSIN HYDROCHLORIDE 0.4 MG: 0.4 CAPSULE ORAL at 15:01

## 2023-10-17 RX ADMIN — INSULIN LISPRO 6 UNITS: 100 INJECTION, SOLUTION INTRAVENOUS; SUBCUTANEOUS at 22:49

## 2023-10-17 RX ADMIN — PIPERACILLIN SODIUM AND TAZOBACTAM SODIUM 3.38 G: 3; .375 INJECTION, SOLUTION INTRAVENOUS at 17:09

## 2023-10-17 RX ADMIN — PIPERACILLIN SODIUM AND TAZOBACTAM SODIUM 4.5 G: 4; .5 INJECTION, SOLUTION INTRAVENOUS at 11:11

## 2023-10-17 RX ADMIN — PANTOPRAZOLE SODIUM 40 MG: 40 TABLET, DELAYED RELEASE ORAL at 07:12

## 2023-10-17 SDOH — SOCIAL STABILITY: SOCIAL INSECURITY: HAS ANYONE EVER THREATENED TO HURT YOUR FAMILY OR YOUR PETS?: NO

## 2023-10-17 SDOH — SOCIAL STABILITY: SOCIAL INSECURITY: ARE THERE ANY APPARENT SIGNS OF INJURIES/BEHAVIORS THAT COULD BE RELATED TO ABUSE/NEGLECT?: NO

## 2023-10-17 SDOH — SOCIAL STABILITY: SOCIAL INSECURITY: ABUSE: ADULT

## 2023-10-17 SDOH — SOCIAL STABILITY: SOCIAL INSECURITY: DO YOU FEEL UNSAFE GOING BACK TO THE PLACE WHERE YOU ARE LIVING?: NO

## 2023-10-17 SDOH — SOCIAL STABILITY: SOCIAL INSECURITY: ARE YOU OR HAVE YOU BEEN THREATENED OR ABUSED PHYSICALLY, EMOTIONALLY, OR SEXUALLY BY ANYONE?: NO

## 2023-10-17 SDOH — SOCIAL STABILITY: SOCIAL INSECURITY: HAVE YOU HAD THOUGHTS OF HARMING ANYONE ELSE?: YES

## 2023-10-17 SDOH — SOCIAL STABILITY: SOCIAL INSECURITY: DO YOU FEEL ANYONE HAS EXPLOITED OR TAKEN ADVANTAGE OF YOU FINANCIALLY OR OF YOUR PERSONAL PROPERTY?: NO

## 2023-10-17 SDOH — SOCIAL STABILITY: SOCIAL INSECURITY: DOES ANYONE TRY TO KEEP YOU FROM HAVING/CONTACTING OTHER FRIENDS OR DOING THINGS OUTSIDE YOUR HOME?: NO

## 2023-10-17 ASSESSMENT — ENCOUNTER SYMPTOMS
CHILLS: 0
DIFFICULTY URINATING: 0
FEVER: 0
EYE REDNESS: 0
PSYCHIATRIC NEGATIVE: 1
BACK PAIN: 0
NEUROLOGICAL NEGATIVE: 1
WEAKNESS: 0
SORE THROAT: 0
NUMBNESS: 0
PALPITATIONS: 0
EYES NEGATIVE: 1
COUGH: 0
SHORTNESS OF BREATH: 0
MYALGIAS: 0
NAUSEA: 0
CHEST TIGHTNESS: 0
WHEEZING: 0
JOINT SWELLING: 0
LIGHT-HEADEDNESS: 0
DIARRHEA: 0
BLOOD IN STOOL: 0
HEADACHES: 0
NECK PAIN: 0
ADENOPATHY: 0
COLOR CHANGE: 1
DIZZINESS: 0
AGITATION: 0
MYALGIAS: 1
APNEA: 0
NAUSEA: 1
DYSURIA: 0
VOMITING: 0
APPETITE CHANGE: 1
HEMATOLOGIC/LYMPHATIC NEGATIVE: 1
ABDOMINAL DISTENTION: 0
ALLERGIC/IMMUNOLOGIC NEGATIVE: 1
NECK STIFFNESS: 0
ABDOMINAL PAIN: 0
BRUISES/BLEEDS EASILY: 0
WOUND: 1
ACTIVITY CHANGE: 0
HEMATURIA: 0
EYE ITCHING: 0
EYE PAIN: 0
CONFUSION: 0
ENDOCRINE NEGATIVE: 1
DIAPHORESIS: 1

## 2023-10-17 ASSESSMENT — COGNITIVE AND FUNCTIONAL STATUS - GENERAL
DAILY ACTIVITIY SCORE: 24
MOBILITY SCORE: 24
DAILY ACTIVITIY SCORE: 24
MOBILITY SCORE: 24
PATIENT BASELINE BEDBOUND: NO

## 2023-10-17 ASSESSMENT — ACTIVITIES OF DAILY LIVING (ADL)
ADEQUATE_TO_COMPLETE_ADL: YES
DRESSING YOURSELF: INDEPENDENT
FEEDING YOURSELF: INDEPENDENT
HEARING - RIGHT EAR: FUNCTIONAL
HEARING - LEFT EAR: FUNCTIONAL
DRESSING YOURSELF: INDEPENDENT
ADEQUATE_TO_COMPLETE_ADL: YES
HEARING - LEFT EAR: FUNCTIONAL
WALKS IN HOME: INDEPENDENT
TOILETING: INDEPENDENT
WALKS IN HOME: INDEPENDENT
GROOMING: INDEPENDENT
LACK_OF_TRANSPORTATION: NO
HEARING - RIGHT EAR: FUNCTIONAL
BATHING: INDEPENDENT
PATIENT'S MEMORY ADEQUATE TO SAFELY COMPLETE DAILY ACTIVITIES?: YES
JUDGMENT_ADEQUATE_SAFELY_COMPLETE_DAILY_ACTIVITIES: YES
JUDGMENT_ADEQUATE_SAFELY_COMPLETE_DAILY_ACTIVITIES: YES
ASSISTIVE_DEVICE: EYEGLASSES
LACK_OF_TRANSPORTATION: NO
GROOMING: INDEPENDENT
FEEDING YOURSELF: INDEPENDENT
TOILETING: INDEPENDENT
BATHING: INDEPENDENT
ASSISTIVE_DEVICE: EYEGLASSES

## 2023-10-17 ASSESSMENT — PAIN SCALES - GENERAL
PAINLEVEL_OUTOF10: 3
PAINLEVEL_OUTOF10: 2
PAINLEVEL_OUTOF10: 2

## 2023-10-17 ASSESSMENT — LIFESTYLE VARIABLES
HOW OFTEN DO YOU HAVE 6 OR MORE DRINKS ON ONE OCCASION: NEVER
SKIP TO QUESTIONS 9-10: 1
HOW OFTEN DO YOU HAVE A DRINK CONTAINING ALCOHOL: NEVER
AUDIT-C TOTAL SCORE: 0
SUBSTANCE_ABUSE_PAST_12_MONTHS: NO
PRESCIPTION_ABUSE_PAST_12_MONTHS: NO
AUDIT-C TOTAL SCORE: 0
HOW MANY STANDARD DRINKS CONTAINING ALCOHOL DO YOU HAVE ON A TYPICAL DAY: PATIENT DOES NOT DRINK

## 2023-10-17 ASSESSMENT — PAIN DESCRIPTION - DESCRIPTORS
DESCRIPTORS: TENDER
DESCRIPTORS: TENDER;SORE

## 2023-10-17 ASSESSMENT — PAIN - FUNCTIONAL ASSESSMENT
PAIN_FUNCTIONAL_ASSESSMENT: 0-10
PAIN_FUNCTIONAL_ASSESSMENT: 0-10

## 2023-10-17 ASSESSMENT — PATIENT HEALTH QUESTIONNAIRE - PHQ9
SUM OF ALL RESPONSES TO PHQ9 QUESTIONS 1 & 2: 0
1. LITTLE INTEREST OR PLEASURE IN DOING THINGS: NOT AT ALL
2. FEELING DOWN, DEPRESSED OR HOPELESS: NOT AT ALL

## 2023-10-17 NOTE — ED PROVIDER NOTES
History of Present Illness   CC: Skin Problem and Leg Swelling (Left)     History provided by: Patient  Limitations to History: History Limitations: None    HPI:  Shoaib Cheng is a 63 y.o. male with history of diabetes presenting to the emergency department with left lower extremity wound for the past 9 days.  He initially sustained a wound after falling in the pool in [place].  Has been on outpatient Keflex but despite this has had worsening pain, redness and swelling.  Notes some purulent drainage at home.  Denies any fevers, difficulty ambulating, numbness in the leg.    External Records Reviewed: None    Physical Exam   Triage vitals:  T 36.8 °C (98.2 °F)  HR 98  /89  RR 18  O2 98 % None (Room air)    Vital signs reviewed in nursing triage note, EMR flow sheets, and at patient's bedside.   GENERAL: Awake, alert, in no acute distress  EYES: Gaze conjugate.  No scleral icterus or injection  HENT: Normo-cephalic, atraumatic. No stridor. No rhinorrhea or epistaxis.  CV: Regular rate, regular rhythm. No murmurs appreciated. Radial pulses 2+ bilaterally  RESP: Breathing non-labored, speaking in full sentences.  Clear to auscultation bilaterally  GI: Soft, non-distended, non-tender. No rebound or guarding.  MSK/Extremities: No gross bony deformities. Moving all extremities.  There is significant swelling in the left tibial region with associated erythema, warmth, fluctuance, tenderness.  Small area of purulent drainage.  No crepitus.  The area of erythema is localized to the anterior shin, does not extend around the posterior leg, does not extend past the knee.  SKIN: Warm. Appropriate color.  Area of erythema as noted above.  NEURO: Alert. Oriented. Face symmetric. Speech is fluent.  Gross strength and sensation intact in b/l UE and LEs  PSYCH: Appropriate mood and affect    ED Course & Medical Decision Making   ED Course:  ED Course as of 10/17/23 0947   Mon Oct 16, 2023   1944 Lower extremity venous  duplex left  Reviewed reviewed from radiology, no evidence of DVT in left lower extremity. [SH]   2012 CBC and Auto Differential(!)  Significant leukocytosis slight left shift [SH]   2016 Discussed patient with podiatry who is at bedside and was consulted by provider in triage who is already performed needle aspiration of the possible abscess and noted purulent fluid so splint underwent I&D at the bedside.  Is requesting admission for IV antibiotics after I&D.  He is also requesting CT of the lower extremity with IV contrast after I&D to assess for possible osteo versus other complication of the patient's purulent cellulitis. [SH]   2042 Comprehensive Metabolic Panel(!)  Mild hyperglycemia and mild hyponatremia [SH]   2042 Lactate  Lactate nl [SH]   2234 Coagulation Screen(!)  Mild INR elevation [SH]   Tue Oct 17, 2023   0105 CT femur left w IV contrast [SH]   0105 CT tibia fibula left w IV contrast  CTs not consistent w/ NSTI [SH]      ED Course User Index  [SH] Emmanuel Rea MD         Diagnoses as of 10/17/23 0947   Cellulitis and abscess of left leg       Differential diagnoses considered include but are not limited to: Cellulitis, abscess, NSTI    Social Determinants Limiting Care:  None identified    MDM:  63-year-old male with history of newly diagnosed diabetes presented to the emergency department with a little over 1 week of progressively worsening wound on his left shin.  On arrival vital signs stable, patient nontoxic-appearing, skin with examination findings concerning for cellulitis versus abscess.  Low suspicion for NSTI based on patient's clinical stability and physical exam findings on arrival.  Patient has a family friend who is a podiatrist privileges at [facility] who accompanies him to the emergency department.  Podiatrist, Dr. Kline reports that on arrival, he performed needle aspiration of the area of largest fluctuance and Purulent fluid which he sent a culture of.  He is planning  to perform a bedside I&D.  Requesting broad-spectrum antibiotics, CT of the lower extremity with contrast after I&D to assess for extent of infection.  Recommended admission for observation.    Patient ministered morphine for pain prior to I&D, ordered as needed analgesics for after I&D.  On reassessment, patient resting comfortably after I&D.  CT completed without subcutaneous air concerning for significant NSTI.  Admitted to observation in stable condition.    As a result of their workup, the patient will require admission to the hospital.  The patient was informed of their diagnosis.  Patient was given the opportunity to ask questions and answered them.  Patient agreed to be admitted to the hospital.  Patient remained stable and under my care.    Procedures   Procedures    Patient seen and discussed with ED attending physician.    Vladimir Rea MD  PGY 3 Emergency Medicine         Emmanuel Rea MD  Resident  10/17/23 4531

## 2023-10-17 NOTE — CONSULTS
Reason For Consult  Left leg infection     History Of Present Illness  Shoaib Cheng is a 63 y.o. male presenting with left leg infection. Approximately 9 days ago, patient bumped his leg in a swimming pool in [place]. Patient continued to swim thinking it was only a bruise. His pain has become worse since the incident. He presented to  urgent care approximately 3 days ago. An xray was performed and negative for any acute process. He was placed on keflex, naproxen, and an opiod for pain. Pain has worsened and decided to come into the hospital for further evaluation. States he has had an increase in sweating over the weekend, but states he did not have a fever when checked. Pain is consistent throughout the day. Noticed increased redness and warmth. Admits to some nausea, but states that he thought this was due to pain. No current appetite. No shortness of breath or chest pain. No history of blood clots. No blood thinners (except the current naproxen). Recently diagnosed with type 2 diabetes and initiated metformin 750mg XR about 2 months ago. Current A1c is 10. Denies any other health issues. No other complaints.     Past Medical History  He has a past medical history of Benign prostatic hyperplasia and Type II diabetes mellitus (CMS/[facility]).    Surgical History  He has a past surgical history that includes Tonsillectomy and Exposure of impacted tooth.     Social History  He reports that he has never smoked. He does not have any smokeless tobacco history on file. He reports current alcohol use of about 1.0 standard drink of alcohol per week. He reports that he does not use drugs.    Family History  No family history on file.     Allergies  Patient has no known allergies.    Review of Systems  Review of Systems   Constitutional:  Positive for appetite change and diaphoresis. Negative for activity change and chills.   HENT:  Negative for hearing loss, sneezing and sore throat.    Eyes:  Negative for pain, redness and  itching.   Respiratory:  Negative for apnea, cough, chest tightness, shortness of breath and wheezing.    Cardiovascular:  Positive for leg swelling. Negative for chest pain and palpitations.   Gastrointestinal:  Positive for nausea. Negative for abdominal distention, abdominal pain, blood in stool, diarrhea and vomiting.   Endocrine: Negative for cold intolerance, heat intolerance and polyuria.   Genitourinary:  Negative for difficulty urinating and dysuria.   Musculoskeletal:  Positive for gait problem. Negative for back pain, joint swelling, myalgias, neck pain and neck stiffness.   Skin:  Positive for color change.   Allergic/Immunologic: Negative for environmental allergies and food allergies.   Neurological:  Negative for dizziness, syncope, numbness and headaches.   Hematological:  Negative for adenopathy. Does not bruise/bleed easily.   Psychiatric/Behavioral:  Negative for agitation, behavioral problems, confusion and self-injury.          Physical Exam  Vascular: DP and PT pulses palpable b/l. Palpable popliteal pulse. Increased warmth to the left anterior shin. Increased erythema to the L anterior shin. 3+ non-pitting edema to LLE, no edema RLE.     Derm: Open wound, appx 0.2x0.2x0.1cm noted to the anterior left shin draining serosanguinous drainage. Palpable fluctuance noted with multiple areas of blanching, consistent with an underlying abscess. There is moderate crepitus. No malodor upon initial presentation, but positive after bed side I&D was performed. Upon drainage fluid showed brian purulence, appx 30cc's.    Neuro: Gross and epicritic sensation is intact. More sensitive to the specific site of injury. LE reflexes intact and normal.    MSK: Significant pain to palpation of the left anterior shin. Antalgic gait secondary to pain. Negative Tiny and Abbasi sign, but guarded due to pain. Negative pain to the knee joint upon palpation. Negative pain to the knee joint upon ROM.     Last Recorded  "Vitals  Blood pressure 116/69, pulse 84, temperature 36.4 °C (97.5 °F), temperature source Temporal, resp. rate 16, height 1.803 m (5' 11\"), weight 103 kg (226 lb), SpO2 97 %.    Relevant Results  Lab Results   Component Value Date    WBC 19.6 (H) 10/17/2023    WBC 21.5 (H) 10/16/2023    WBC 10.9 08/08/2023    HGB 11.4 (L) 10/17/2023    HGB 12.8 (L) 10/16/2023    HGB 14.6 08/08/2023    HCT 33.7 (L) 10/17/2023    HCT 36.4 (L) 10/16/2023    HCT 43.4 08/08/2023    MCV 92 10/17/2023    MCV 90 10/16/2023    MCV 93 08/08/2023     10/17/2023     10/16/2023     08/08/2023    NEUTROABS 14.50 (H) 10/17/2023    NEUTROABS 16.59 (H) 10/16/2023     Lab Results   Component Value Date    CALCIUM 8.5 (L) 10/17/2023    CALCIUM 9.0 10/16/2023    CALCIUM 9.4 08/08/2023     (L) 10/17/2023     (L) 10/16/2023     08/08/2023    K 4.2 10/17/2023    K 4.4 10/16/2023    K 4.2 08/08/2023    CO2 27 10/17/2023    CO2 26 10/16/2023    CO2 26 08/08/2023    CL 96 (L) 10/17/2023    CL 95 (L) 10/16/2023     08/08/2023    BUN 16 10/17/2023    BUN 17 10/16/2023    BUN 22 08/08/2023    CREATININE 0.83 10/17/2023    CREATININE 0.83 10/16/2023    CREATININE 1.06 08/08/2023     Lab Results   Component Value Date    ALT 53 (H) 10/17/2023    ALT 67 (H) 10/16/2023    ALT 18 08/08/2023    AST 20 10/17/2023    AST 26 10/16/2023    AST 12 08/08/2023    ALKPHOS 255 (H) 10/17/2023    ALKPHOS 305 (H) 10/16/2023    ALKPHOS 76 08/08/2023    BILITOT 0.8 10/17/2023    BILITOT 0.7 10/16/2023    BILITOT 0.4 08/08/2023     Lab Results   Component Value Date    INR 1.4 (H) 10/16/2023       Current Facility-Administered Medications:     acetaminophen (Tylenol) tablet 975 mg, 975 mg, oral, q6h PRN, Jory Conde PA-C    dextrose 10 % in water (D10W) infusion, 0.3 g/kg/hr, intravenous, Once PRN, Jory Conde PA-C    dextrose 50 % injection 25 g, 25 g, intravenous, q15 min PRN, Jory Conde PA-C    docusate sodium " (Colace) capsule 100 mg, 100 mg, oral, BID, Jory Conde PA-C    enoxaparin (Lovenox) syringe 40 mg, 40 mg, subcutaneous, q24h, Jory Conde PA-C, 40 mg at 10/17/23 1018    glucagon (Glucagen) injection 1 mg, 1 mg, intramuscular, q15 min PRN, Jory Conde PA-C    insulin lispro (HumaLOG) injection 0-10 Units, 0-10 Units, subcutaneous, Before meals & nightly, Jory Conde PA-C, 6 Units at 10/17/23 1319    melatonin tablet 3 mg, 3 mg, oral, Daily, Jory Conde PA-C    morphine injection 2 mg, 2 mg, intravenous, q4h PRN, Jory Conde PA-C    oxyCODONE (Roxicodone) immediate release tablet 5 mg, 5 mg, oral, q6h PRN, Jory Conde PA-C    pantoprazole (ProtoNix) EC tablet 40 mg, 40 mg, oral, Daily before breakfast, 40 mg at 10/17/23 0712 **OR** pantoprazole (ProtoNix) injection 40 mg, 40 mg, intravenous, Daily before breakfast, Jory Conde PA-C    piperacillin-tazobactam-dextrose (Zosyn) IV 3.375 g, 3.375 g, intravenous, q6h, Gabbi Atwood MD    tamsulosin (Flomax) 24 hr capsule 0.4 mg, 0.4 mg, oral, Daily before breakfast, VIJAY Chaudhary-CNP, 0.4 mg at 10/17/23 1501    vancomycin (Vancocin) in dextrose 5% 250 mL IV 1,250 mg, 1,250 mg, intravenous, q12h, Jory Conde PA-C, 1,250 mg at 10/17/23 1502     Radiology Results (last 21 days)    Procedure Component Value Units Date/Time   CT femur left w IV contrast [[id number]] Collected: 10/17/23 0104   Order Status: Completed Updated: 10/17/23 0104   Narrative:     Interpreted By:  Lamin Hampton,  STUDY:  CT TIBIA FIBULA LEFT W IV CONTRAST; CT FEMUR LEFT W IV CONTRAST;  10/17/2023 12:09 am      INDICATION:  Signs/Symptoms:LLE pretibial abscess; Signs/Symptoms:LLE Pretibial  abscess.  Please obatin CT LLE w/ IV contrast.  No need for imaging  of RLE..      COMPARISON:  None.      ACCESSION NUMBER(S):  [id number]; [id number]      ORDERING CLINICIAN:  GUNJAN STACK      TECHNIQUE:  CT imaging of the left femur and tibia/fibula  was obtained after the  intravenous administration of 75 mL Omnipaque 350 contrast. Coronal  and sagittal reformatted images were performed. 3D reconstructed  images were not performed at time of dictation therefore not used  during interpretation.      FINDINGS:  OSSEOUS STRUCTURES:      There is no acute fracture of the left femur or left tibia/fibula.  There is normal alignment of the left hip, knee, and ankle. There is  mild left hip osteoarthrosis without joint effusion. There is mild  medial and patellofemoral compartment osteoarthrosis of the left knee  without significant joint effusion. There is no significant  degenerative change or effusion of the left tibiotalar joint.  Enthesopathic spurring at the Achilles tendon insertion.  There is no  osseous destruction or erosive change throughout the left lower  extremity.          SOFT TISSUES:      There is a 9.5 cm x 5.7 cm x 1.3 cm rim enhancing fluid collection  consistent with abscess. This extends from the mid patellar tendon to  the proximal tibial diaphysis (sagittal image 77/149). It abuts the  fascia of the anterior compartment musculature (axial image 299/497,  series 606), abuts the tibial tuberosity, and drains to the skin  surface. There is diffuse thickening of the skin in the mid to  proximal left leg. There is diffuse subcutaneous edema involving the  entire left leg that is most severe at the level of the abscess.  There is also subcutaneous edema of the left thigh that is  circumferential distally and located along the medial and  anteromedial aspects slightly more proximally. There is no evidence  of deep compartment infection. There is no soft tissue gas except for  few foci of gas within the abscess.      There are reactive enlarged left inguinal lymph nodes measuring up to  1.4 cm.          OTHER:      The prostate gland is enlarged, measuring 5.1 cm in transverse  dimension. There is diverticulosis of the partially visualized  sigmoid  colon. Incidental note of small bilateral hydroceles.       Impression:     1. 9.5 cm x 5.7 cm x 1.3 cm abscess in the upper pretibial soft  tissues extending from the mid patellar tendon to the upper tibial  diaphysis, abuts the tibial tuberosity and fascia of the anterior  compartment musculature in contains a tiny amount of internal gas  locules that may be due to the fact that it drains to the skin  surface or attempted drainage. No deep compartment involvement, no  evidence of myositis, necrotizing fasciitis, or osteomyelitis.      2. Cellulitis diffusely involving the left leg throughout its entire  length, most severe at the level of the abscess. There is also milder  cellulitis of the thyroid is circumferential distally and involves  the medial and anteromedial aspects of the mid proximal left thigh.      MACRO:  None.      Signed by: Lamin Hampton 10/17/2023 1:03 AM  Dictation workstation:   [id number]   CT tibia fibula left w IV contrast [[id number]] Collected: 10/17/23 0104   Order Status: Completed Updated: 10/17/23 0104   Narrative:     Interpreted By:  Lamin Hampton,  STUDY:  CT TIBIA FIBULA LEFT W IV CONTRAST; CT FEMUR LEFT W IV CONTRAST;  10/17/2023 12:09 am      INDICATION:  Signs/Symptoms:LLE pretibial abscess; Signs/Symptoms:LLE Pretibial  abscess.  Please obatin CT LLE w/ IV contrast.  No need for imaging  of RLE..      COMPARISON:  None.      ACCESSION NUMBER(S):  [id number]; [id number]      ORDERING CLINICIAN:  GUNJAN STACK      TECHNIQUE:  CT imaging of the left femur and tibia/fibula was obtained after the  intravenous administration of 75 mL Omnipaque 350 contrast. Coronal  and sagittal reformatted images were performed. 3D reconstructed  images were not performed at time of dictation therefore not used  during interpretation.      FINDINGS:  OSSEOUS STRUCTURES:      There is no acute fracture of the left femur or left tibia/fibula.  There is normal alignment of the left hip,  knee, and ankle. There is  mild left hip osteoarthrosis without joint effusion. There is mild  medial and patellofemoral compartment osteoarthrosis of the left knee  without significant joint effusion. There is no significant  degenerative change or effusion of the left tibiotalar joint.  Enthesopathic spurring at the Achilles tendon insertion.  There is no  osseous destruction or erosive change throughout the left lower  extremity.          SOFT TISSUES:      There is a 9.5 cm x 5.7 cm x 1.3 cm rim enhancing fluid collection  consistent with abscess. This extends from the mid patellar tendon to  the proximal tibial diaphysis (sagittal image 77/149). It abuts the  fascia of the anterior compartment musculature (axial image 299/497,  series 606), abuts the tibial tuberosity, and drains to the skin  surface. There is diffuse thickening of the skin in the mid to  proximal left leg. There is diffuse subcutaneous edema involving the  entire left leg that is most severe at the level of the abscess.  There is also subcutaneous edema of the left thigh that is  circumferential distally and located along the medial and  anteromedial aspects slightly more proximally. There is no evidence  of deep compartment infection. There is no soft tissue gas except for  few foci of gas within the abscess.      There are reactive enlarged left inguinal lymph nodes measuring up to  1.4 cm.          OTHER:      The prostate gland is enlarged, measuring 5.1 cm in transverse  dimension. There is diverticulosis of the partially visualized  sigmoid colon. Incidental note of small bilateral hydroceles.       Impression:     1. 9.5 cm x 5.7 cm x 1.3 cm abscess in the upper pretibial soft  tissues extending from the mid patellar tendon to the upper tibial  diaphysis, abuts the tibial tuberosity and fascia of the anterior  compartment musculature in contains a tiny amount of internal gas  locules that may be due to the fact that it drains to the  skin  surface or attempted drainage. No deep compartment involvement, no  evidence of myositis, necrotizing fasciitis, or osteomyelitis.      2. Cellulitis diffusely involving the left leg throughout its entire  length, most severe at the level of the abscess. There is also milder  cellulitis of the thyroid is circumferential distally and involves  the medial and anteromedial aspects of the mid proximal left thigh.      MACRO:  None.      Signed by: Lamin Hampton 10/17/2023 1:03 AM  Dictation workstation:   [id number]   Urgent Care Xray [[id number]] Collected: 10/13/23 1921   Order Status: Completed Updated: 10/13/23 1921   Narrative:     Interpreted By:  Elias Davies,  STUDY:  XR URGENT CARE XRAY; ;  10/13/2023 6:50 pm      INDICATION:  Signs/Symptoms:left lower leg pain.      COMPARISON:  None.      ACCESSION NUMBER(S):  [id number]      ORDERING CLINICIAN:  SHELBY CHRISTENSEN      FINDINGS:  No acute fracture. There is cortical thickening seen along the  posterior aspect of the left tibia. No evidence of stress fracture or  stress reaction in the bone. If persistent concern, MRI can be  considered       Impression:     No correlate seen in the area of clinical concern. If concern for  stress fracture or stress reaction in the bone, consider further  evaluation with MRI. No acute fracture      Benign-appearing cortical thinning noted along the left posterior  tibial          MACRO:  None      Signed by: Elias Davies 10/13/2023 7:19 PM  Dictation workstation:   [id number]     Final result by Lamin Hampton MD (10/16/23 18:57:53)                Impression:    No evidence of acute DVT in the left lower extremity.      MACRO:  None.      Signed by: Lamin Hampton 10/16/2023 6:57 PM  Dictation workstation:   [id number]            Narrative:    Interpreted By:  Lamin Hampton,  STUDY:  VASC US LOWER EXTREMITY VENOUS DUPLEX LEFT;  10/16/2023 6:55 pm      INDICATION:  Signs/Symptoms:L leg pain.       COMPARISON:  None.      ACCESSION NUMBER(S):  [id number]      ORDERING CLINICIAN:  JUAN MCNEIL      TECHNIQUE:  Grayscale, color and spectral Doppler sonographic images of the left  lower extremity deep venous system. The right common femoral vein was  imaged for comparison.      FINDINGS:  THIGH VEINS: There is normal compressibility of the left common  femoral vein, saphenous-femoral junction, femoral vein and popliteal  vein. There is normal spontaneous and phasic variation by spectral  doppler.      CALF VEINS: The posterior tibial and peroneal veins demonstrate  normal color flow and compressibility.      CONTRALATERAL COMPARISON: The right common femoral vein is patent.      OTHER FINDINGS: None.               Procedure note placed in additional note. General findings of appx 30 ccs of purulent drainage to the anterior shin, distal to the tibial tuberosity without any obvious tracking proximally. Mild coagulated hematogenous fluid expressed     Assessment/Plan     Infected hematoma of left anterior shin.  DVT US was recommended and was negative  Bedside I&D performed. Findings above and refer to procedural note.  CT showed residual abscess of the left anterior shin. Likely will need a more formal washout in the operating room. Recommend considering a MRI for further evaluation of any bone involvement, but suspicion is low due CT reading of suprafascial abscess without deep extension.   Broad spectrum antibiotics of vancomycin and zosyn.  Patient will be made NPO after midnight (CT was still in process last night). This is important while awaiting the severity of the infection and the potential need for the OR the following day pending imaging and labs.  Formal consult to be placed with Podiatry. I discussed this with Juan KING and observed the consult order being placed.  All recommendations and updates regarding my treatment plan were discussed in detail with the ED Attending  Details regarding plan  of care were discussed extensively with patient and family.  I contacted and received confirmation from my podiatry partners in the evening on 10/16 regarding the patient, treatment provided, and my plan of care at that time.     I spent 120 minutes in the professional and overall care of this patient.      Bryan Kline DPM

## 2023-10-17 NOTE — CONSULTS
PODIATRY CONSULT NOTE    SERVICE DATE: 10/17/2023   SERVICE TIME:  1130    REASON FOR CONSULT: pre tibial abscess  REQUESTING PHYSICIAN: Elias Jean PA-C   PRIMARY CARE PHYSICIAN: Jomar Garcia MD    Subjective   HPI:  Mr. Cheng is a 63 y.o. male who presents with wound to LLE wound x 9 days. Wound sustained 10/7 in [place] after slipping on pool stair and scraping L leg. [place] private pool. Denies injury elsewhere. Rested, iced, elevated leg following week with some relief but still noted increasing swelling, redness, pain. Friday 10/14 went to urgent care due to severe pain. Prescribed keflex, took 2-3 days, subsequently presented to ED 10/16 due to worsening pain, redness, swelling despite antibiotic. Did note at some point during week, wound opened and drained clear, yellow fluid. Ambulating ok but painful. Denies f/c, CP, SOB, dyspnea, n/v, changes in stool or urine.  .    Podiatry was consulted for pre tibial abscess. Per patient and attending provider in the observation unit, patient was seen in the ED by Dr. Kline who preformed a bedside I&D of the left leg 10/16/23. Podiatry consulted today for further evaluation of the pre tibial abscess.     ROS: 10-point review of systems was performed and is otherwise negative except as noted in HPI.  PMH: Reviewed/documented below.  PSH:  Noncontributory except per HPI   FH: Reviewed and noncontributory   SOCIAL:  Reviewed/documented below.  ALLERGIES: Reviewed/documented below.  MEDS: Reviewed/documented below.  VS: Reviewed/documented below.    Past Medical History:   Diagnosis Date    Benign prostatic hyperplasia     Type II diabetes mellitus (CMS/HCC)      Past Surgical History:   Procedure Laterality Date    EXPOSURE OF IMPACTED TOOTH      TONSILLECTOMY       No family history on file.  Social History     Tobacco Use    Smoking status: Never   Substance Use Topics    Alcohol use: Yes     Alcohol/week: 1.0 standard drink of alcohol     Types: 1  Glasses of wine per week    Drug use: Never      Medications Prior to Admission   Medication Sig Dispense Refill Last Dose    Accu-Chek Guide Me Glucose Mtr misc test once daily   Past Month    Accu-Chek Guide test strips strip TEST daily   Past Month    Accu-Chek Softclix Lancets misc use 1 LANCET to TEST BLOOD SUGAR once daily   Past Month    metFORMIN XR (Glucophage-XR) 750 mg 24 hr tablet Take 1 tablet (750 mg) by mouth once daily in the evening. Take with meals. Do not crush, chew, or split. 30 tablet 11 10/16/2023    sildenafil (Viagra) 50 mg tablet Take 1 tablet (50 mg) by mouth once daily as needed for erectile dysfunction.   More than a month    tamsulosin (Flomax) 0.4 mg 24 hr capsule Take 1 capsule (0.4 mg) by mouth once daily in the morning. Take before meals.   10/13/2023        Medications:  Scheduled Meds: docusate sodium, 100 mg, oral, BID  enoxaparin, 40 mg, subcutaneous, q24h  insulin lispro, 0-10 Units, subcutaneous, Before meals & nightly  melatonin, 3 mg, oral, Daily  pantoprazole, 40 mg, oral, Daily before breakfast   Or  pantoprazole, 40 mg, intravenous, Daily before breakfast  piperacillin-tazobactam, 3.375 g, intravenous, q6h  tamsulosin, 0.4 mg, oral, Daily before breakfast  vancomycin, 1,250 mg, intravenous, q12h      Continuous Infusions:    PRN Meds: PRN medications: acetaminophen, dextrose 10 % in water (D10W), dextrose, glucagon, morphine, oxyCODONE    Allergies as of 10/16/2023    (No Known Allergies)            Objective   PHYSICAL EXAM:  Physical Exam Performed:  Vitals:    10/17/23 1126   BP: 116/69   Pulse: 84   Resp: 16   Temp: 36.4 °C (97.5 °F)   SpO2: 97%     Body mass index is 31.52 kg/m².    Patient is AOx3 and in no acute distress. Patient is alert and cooperative. Sitting comfortably in bed with dressing clean, dry and intact. Heel off-loading boots in place B/L.    Vascular: Faintly palpable DP/PT pulses B/L. Moderate pitting edema noted to the LLE from the level of knee  to  ankle.  Hair growth diminished B/L. CFT<5 to B/L hallux. Temperature is warm to warm from tibial tuberosity to distal digits B/L. Significant erythema present at pre-tibial and patellar tendon region of left leg extending both distally and proximally.     Musculoskeletal: Gross active and passive ROM intact to age and activity level. Moves all extremities spontaneously. No pain to palpation at feet B/L.     Neurological: Intact light touch sensation B/L. Pain stimuli intact B/L. Denies any numbness, burning or tingling.    Dermatologic: Nails 1-5 are within normal limits for thickness and length B/L. Skin appears well hydrated B/L. Web spaces 1-4 B/L are clean, dry and intact. No rashes or nodules noted B/L. No hyperkeratotic tissue noted B/L.     Wound: left anterior lower leg  Visible abscess present to the anterior leg encompassing and extending past the level of the knee  Significant purulent drainage with fibrotic slough.   No margarita-wound maceration.   Significant margarita-wound erythema.   Moderate evidence of ascending cellulitis or lymphangitis.  Significant palpable fluctuance. Mild malodor. Significant increased warmth.       LABS:   Results for orders placed or performed during the hospital encounter of 10/16/23 (from the past 24 hour(s))   CBC and Auto Differential   Result Value Ref Range    WBC 21.5 (H) 4.4 - 11.3 x10*3/uL    nRBC 0.0 0.0 - 0.0 /100 WBCs    RBC 4.06 (L) 4.50 - 5.90 x10*6/uL    Hemoglobin 12.8 (L) 13.5 - 17.5 g/dL    Hematocrit 36.4 (L) 41.0 - 52.0 %    MCV 90 80 - 100 fL    MCH 31.5 26.0 - 34.0 pg    MCHC 35.2 32.0 - 36.0 g/dL    RDW 12.2 11.5 - 14.5 %    Platelets 395 150 - 450 x10*3/uL    MPV 9.4 7.5 - 11.5 fL    Neutrophils % 77.1 40.0 - 80.0 %    Immature Granulocytes %, Automated 2.9 (H) 0.0 - 0.9 %    Lymphocytes % 11.4 13.0 - 44.0 %    Monocytes % 6.7 2.0 - 10.0 %    Eosinophils % 1.1 0.0 - 6.0 %    Basophils % 0.8 0.0 - 2.0 %    Neutrophils Absolute 16.59 (H) 1.20 - 7.70  x10*3/uL    Immature Granulocytes Absolute, Automated 0.63 0.00 - 0.70 x10*3/uL    Lymphocytes Absolute 2.46 1.20 - 4.80 x10*3/uL    Monocytes Absolute 1.44 (H) 0.10 - 1.00 x10*3/uL    Eosinophils Absolute 0.23 0.00 - 0.70 x10*3/uL    Basophils Absolute 0.17 (H) 0.00 - 0.10 x10*3/uL   Comprehensive Metabolic Panel   Result Value Ref Range    Glucose 367 (H) 74 - 99 mg/dL    Sodium 130 (L) 136 - 145 mmol/L    Potassium 4.4 3.5 - 5.3 mmol/L    Chloride 95 (L) 98 - 107 mmol/L    Bicarbonate 26 21 - 32 mmol/L    Anion Gap 13 10 - 20 mmol/L    Urea Nitrogen 17 6 - 23 mg/dL    Creatinine 0.83 0.50 - 1.30 mg/dL    eGFR >90 >60 mL/min/1.73m*2    Calcium 9.0 8.6 - 10.3 mg/dL    Albumin 3.9 3.4 - 5.0 g/dL    Alkaline Phosphatase 305 (H) 33 - 136 U/L    Total Protein 7.4 6.4 - 8.2 g/dL    AST 26 9 - 39 U/L    Bilirubin, Total 0.7 0.0 - 1.2 mg/dL    ALT 67 (H) 10 - 52 U/L   Lactate   Result Value Ref Range    Lactate 1.7 0.4 - 2.0 mmol/L   Blood Culture    Specimen: Peripheral Venipuncture; Blood culture   Result Value Ref Range    Blood Culture Loaded on Instrument - Culture in progress    Blood Culture    Specimen: Peripheral Venipuncture; Blood culture   Result Value Ref Range    Blood Culture Loaded on Instrument - Culture in progress    Coagulation Screen   Result Value Ref Range    Protime 15.9 (H) 9.8 - 12.8 seconds    INR 1.4 (H) 0.9 - 1.1    aPTT 32 27 - 38 seconds   Urinalysis with Reflex Microscopic and Culture   Result Value Ref Range    Color, Urine Yellow Straw, Yellow    Appearance, Urine Clear Clear    Specific Gravity, Urine 1.040 (N) 1.005 - 1.035    pH, Urine 6.0 5.0, 5.5, 6.0, 6.5, 7.0, 7.5, 8.0    Protein, Urine NEGATIVE NEGATIVE mg/dL    Glucose, Urine >=500 (3+) (A) NEGATIVE mg/dL    Blood, Urine NEGATIVE NEGATIVE    Ketones, Urine NEGATIVE NEGATIVE mg/dL    Bilirubin, Urine NEGATIVE NEGATIVE    Urobilinogen, Urine <2.0 <2.0 mg/dL    Nitrite, Urine NEGATIVE NEGATIVE    Leukocyte Esterase, Urine NEGATIVE  "NEGATIVE   Comprehensive metabolic panel   Result Value Ref Range    Glucose 319 (H) 74 - 99 mg/dL    Sodium 132 (L) 136 - 145 mmol/L    Potassium 4.2 3.5 - 5.3 mmol/L    Chloride 96 (L) 98 - 107 mmol/L    Bicarbonate 27 21 - 32 mmol/L    Anion Gap 13 10 - 20 mmol/L    Urea Nitrogen 16 6 - 23 mg/dL    Creatinine 0.83 0.50 - 1.30 mg/dL    eGFR >90 >60 mL/min/1.73m*2    Calcium 8.5 (L) 8.6 - 10.3 mg/dL    Albumin 3.2 (L) 3.4 - 5.0 g/dL    Alkaline Phosphatase 255 (H) 33 - 136 U/L    Total Protein 6.3 (L) 6.4 - 8.2 g/dL    AST 20 9 - 39 U/L    Bilirubin, Total 0.8 0.0 - 1.2 mg/dL    ALT 53 (H) 10 - 52 U/L   CBC and Auto Differential   Result Value Ref Range    WBC 19.6 (H) 4.4 - 11.3 x10*3/uL    nRBC 0.0 0.0 - 0.0 /100 WBCs    RBC 3.68 (L) 4.50 - 5.90 x10*6/uL    Hemoglobin 11.4 (L) 13.5 - 17.5 g/dL    Hematocrit 33.7 (L) 41.0 - 52.0 %    MCV 92 80 - 100 fL    MCH 31.0 26.0 - 34.0 pg    MCHC 33.8 32.0 - 36.0 g/dL    RDW 12.4 11.5 - 14.5 %    Platelets 345 150 - 450 x10*3/uL    MPV 9.4 7.5 - 11.5 fL    Neutrophils % 73.9 40.0 - 80.0 %    Immature Granulocytes %, Automated 2.4 (H) 0.0 - 0.9 %    Lymphocytes % 14.0 13.0 - 44.0 %    Monocytes % 7.6 2.0 - 10.0 %    Eosinophils % 1.3 0.0 - 6.0 %    Basophils % 0.8 0.0 - 2.0 %    Neutrophils Absolute 14.50 (H) 1.20 - 7.70 x10*3/uL    Immature Granulocytes Absolute, Automated 0.47 0.00 - 0.70 x10*3/uL    Lymphocytes Absolute 2.75 1.20 - 4.80 x10*3/uL    Monocytes Absolute 1.49 (H) 0.10 - 1.00 x10*3/uL    Eosinophils Absolute 0.25 0.00 - 0.70 x10*3/uL    Basophils Absolute 0.16 (H) 0.00 - 0.10 x10*3/uL   POCT GLUCOSE   Result Value Ref Range    POCT Glucose 333 (H) 74 - 99 mg/dL   POCT GLUCOSE   Result Value Ref Range    POCT Glucose 292 (H) 74 - 99 mg/dL      Lab Results   Component Value Date    HGBA1C 10.0 (A) 08/08/2023      No results found for: \"CRP\"   No results found for: \"SEDRATE\"     Results from last 7 days   Lab Units 10/17/23  0442   WBC AUTO x10*3/uL 19.6*   RBC " AUTO x10*6/uL 3.68*   HEMOGLOBIN g/dL 11.4*   HEMATOCRIT % 33.7*     Results from last 7 days   Lab Units 10/17/23  0441   SODIUM mmol/L 132*   POTASSIUM mmol/L 4.2   CHLORIDE mmol/L 96*   CO2 mmol/L 27   BUN mg/dL 16   CREATININE mg/dL 0.83   CALCIUM mg/dL 8.5*   BILIRUBIN TOTAL mg/dL 0.8   ALT U/L 53*   AST U/L 20     Results from last 7 days   Lab Units 10/17/23  0106   COLOR U  Yellow   APPEARANCE U  Clear   PH U  6.0   SPEC GRAV UR  1.040*   PROTEIN U mg/dL NEGATIVE   BLOOD UR  NEGATIVE   NITRITE U  NEGATIVE       IMAGING REVIEW:  CT femur left w IV contrast    Result Date: 10/17/2023  Interpreted By:  Lamin Hampton, STUDY: CT TIBIA FIBULA LEFT W IV CONTRAST; CT FEMUR LEFT W IV CONTRAST; 10/17/2023 12:09 am   INDICATION: Signs/Symptoms:LLE pretibial abscess; Signs/Symptoms:LLE Pretibial abscess.  Please obatin CT LLE w/ IV contrast.  No need for imaging of RLE..   COMPARISON: None.   ACCESSION NUMBER(S): [id number]; [id number]   ORDERING CLINICIAN: GUNJAN STACK   TECHNIQUE: CT imaging of the left femur and tibia/fibula was obtained after the intravenous administration of 75 mL Omnipaque 350 contrast. Coronal and sagittal reformatted images were performed. 3D reconstructed images were not performed at time of dictation therefore not used during interpretation.   FINDINGS: OSSEOUS STRUCTURES:   There is no acute fracture of the left femur or left tibia/fibula. There is normal alignment of the left hip, knee, and ankle. There is mild left hip osteoarthrosis without joint effusion. There is mild medial and patellofemoral compartment osteoarthrosis of the left knee without significant joint effusion. There is no significant degenerative change or effusion of the left tibiotalar joint. Enthesopathic spurring at the Achilles tendon insertion.  There is no osseous destruction or erosive change throughout the left lower extremity.     SOFT TISSUES:   There is a 9.5 cm x 5.7 cm x 1.3 cm rim enhancing fluid  collection consistent with abscess. This extends from the mid patellar tendon to the proximal tibial diaphysis (sagittal image 77/149). It abuts the fascia of the anterior compartment musculature (axial image 299/497, series 606), abuts the tibial tuberosity, and drains to the skin surface. There is diffuse thickening of the skin in the mid to proximal left leg. There is diffuse subcutaneous edema involving the entire left leg that is most severe at the level of the abscess. There is also subcutaneous edema of the left thigh that is circumferential distally and located along the medial and anteromedial aspects slightly more proximally. There is no evidence of deep compartment infection. There is no soft tissue gas except for few foci of gas within the abscess.   There are reactive enlarged left inguinal lymph nodes measuring up to 1.4 cm.     OTHER:   The prostate gland is enlarged, measuring 5.1 cm in transverse dimension. There is diverticulosis of the partially visualized sigmoid colon. Incidental note of small bilateral hydroceles.       1. 9.5 cm x 5.7 cm x 1.3 cm abscess in the upper pretibial soft tissues extending from the mid patellar tendon to the upper tibial diaphysis, abuts the tibial tuberosity and fascia of the anterior compartment musculature in contains a tiny amount of internal gas locules that may be due to the fact that it drains to the skin surface or attempted drainage. No deep compartment involvement, no evidence of myositis, necrotizing fasciitis, or osteomyelitis.   2. Cellulitis diffusely involving the left leg throughout its entire length, most severe at the level of the abscess. There is also milder cellulitis of the thyroid is circumferential distally and involves the medial and anteromedial aspects of the mid proximal left thigh.   MACRO: None.   Signed by: Lamin Hampton 10/17/2023 1:03 AM Dictation workstation:   [id number]    CT tibia fibula left w IV contrast    Result Date:  10/17/2023  Interpreted By:  Lamin Hampton, STUDY: CT TIBIA FIBULA LEFT W IV CONTRAST; CT FEMUR LEFT W IV CONTRAST; 10/17/2023 12:09 am   INDICATION: Signs/Symptoms:LLE pretibial abscess; Signs/Symptoms:LLE Pretibial abscess.  Please obatin CT LLE w/ IV contrast.  No need for imaging of RLE..   COMPARISON: None.   ACCESSION NUMBER(S): [id number]; [id number]   ORDERING CLINICIAN: GUNJAN STACK   TECHNIQUE: CT imaging of the left femur and tibia/fibula was obtained after the intravenous administration of 75 mL Omnipaque 350 contrast. Coronal and sagittal reformatted images were performed. 3D reconstructed images were not performed at time of dictation therefore not used during interpretation.   FINDINGS: OSSEOUS STRUCTURES:   There is no acute fracture of the left femur or left tibia/fibula. There is normal alignment of the left hip, knee, and ankle. There is mild left hip osteoarthrosis without joint effusion. There is mild medial and patellofemoral compartment osteoarthrosis of the left knee without significant joint effusion. There is no significant degenerative change or effusion of the left tibiotalar joint. Enthesopathic spurring at the Achilles tendon insertion.  There is no osseous destruction or erosive change throughout the left lower extremity.     SOFT TISSUES:   There is a 9.5 cm x 5.7 cm x 1.3 cm rim enhancing fluid collection consistent with abscess. This extends from the mid patellar tendon to the proximal tibial diaphysis (sagittal image 77/149). It abuts the fascia of the anterior compartment musculature (axial image 299/497, series 606), abuts the tibial tuberosity, and drains to the skin surface. There is diffuse thickening of the skin in the mid to proximal left leg. There is diffuse subcutaneous edema involving the entire left leg that is most severe at the level of the abscess. There is also subcutaneous edema of the left thigh that is circumferential distally and located along the medial  and anteromedial aspects slightly more proximally. There is no evidence of deep compartment infection. There is no soft tissue gas except for few foci of gas within the abscess.   There are reactive enlarged left inguinal lymph nodes measuring up to 1.4 cm.     OTHER:   The prostate gland is enlarged, measuring 5.1 cm in transverse dimension. There is diverticulosis of the partially visualized sigmoid colon. Incidental note of small bilateral hydroceles.       1. 9.5 cm x 5.7 cm x 1.3 cm abscess in the upper pretibial soft tissues extending from the mid patellar tendon to the upper tibial diaphysis, abuts the tibial tuberosity and fascia of the anterior compartment musculature in contains a tiny amount of internal gas locules that may be due to the fact that it drains to the skin surface or attempted drainage. No deep compartment involvement, no evidence of myositis, necrotizing fasciitis, or osteomyelitis.   2. Cellulitis diffusely involving the left leg throughout its entire length, most severe at the level of the abscess. There is also milder cellulitis of the thyroid is circumferential distally and involves the medial and anteromedial aspects of the mid proximal left thigh.   MACRO: None.   Signed by: Lamin Hampton 10/17/2023 1:03 AM Dictation workstation:   [id number]    Lower extremity venous duplex left    Result Date: 10/16/2023  Interpreted By:  Lamin Hampton, STUDY: [facility] LOWER EXTREMITY VENOUS DUPLEX LEFT;  10/16/2023 6:55 pm   INDICATION: Signs/Symptoms:L leg pain.   COMPARISON: None.   ACCESSION NUMBER(S): [id number]   ORDERING CLINICIAN: JUAN MCNEIL   TECHNIQUE: Grayscale, color and spectral Doppler sonographic images of the left lower extremity deep venous system. The right common femoral vein was imaged for comparison.   FINDINGS: THIGH VEINS: There is normal compressibility of the left common femoral vein, saphenous-femoral junction, femoral vein and popliteal vein. There is normal  spontaneous and phasic variation by spectral doppler.   CALF VEINS: The posterior tibial and peroneal veins demonstrate normal color flow and compressibility.   CONTRALATERAL COMPARISON: The right common femoral vein is patent.   OTHER FINDINGS: None.       No evidence of acute DVT in the left lower extremity.   MACRO: None.   Signed by: Lamin Hampton 10/16/2023 6:57 PM Dictation workstation:   [id number]    Urgent Care Xray    Result Date: 10/13/2023  Interpreted By:  Elias Davies, STUDY: XR URGENT CARE XRAY; ;  10/13/2023 6:50 pm   INDICATION: Signs/Symptoms:left lower leg pain.   COMPARISON: None.   ACCESSION NUMBER(S): [id number]   ORDERING CLINICIAN: SHELBY CHRISTENSEN   FINDINGS: No acute fracture. There is cortical thickening seen along the posterior aspect of the left tibia. No evidence of stress fracture or stress reaction in the bone. If persistent concern, MRI can be considered       No correlate seen in the area of clinical concern. If concern for stress fracture or stress reaction in the bone, consider further evaluation with MRI. No acute fracture   Benign-appearing cortical thinning noted along the left posterior tibial     MACRO: None   Signed by: Elias Davies 10/13/2023 7:19 PM Dictation workstation:   [id number]    Colonoscopy    Result Date: 9/25/2023  Patient Name: Shoaib Cheng Procedure Date: 9/25/2023 8:33 AM MRN: [id number] Account Number: [id number] YOB: 1960 Admit Type: Outpatient Site: [facility] Room 2 Ethnicity: Not  or  Race: White Attending MD: Mendel Marquez MD, [id number] Procedure:             Colonoscopy Indications:           High risk colon cancer surveillance: Personal history                        of colonic polyps Patient Profile:       This is a 63 year old male. Refer to note in patient                        chart for documentation of history and physical. Last                        Colonoscopy: 2013. Providers:             Mendel  JAYME Marquez MD (Doctor) Gastroenterology                        [place], [place], Jeni Roman RN (Nurse) , Emma Manuel Referring:             Jomar Garcia MD Medicines:             Monitored Anesthesia Care Complications:         No immediate complications. Procedure:             Pre-Anesthesia Assessment:                        - Prior to the procedure, a History and Physical was                        performed, and patient medications and allergies were                        reviewed. The patient's tolerance of previous                        anesthesia was also reviewed. The risks and benefits                        of the procedure and the sedation options and risks                        were discussed with the patient. All questions were                        answered, and informed consent was obtained. Prior                        Anticoagulants: The patient has taken no anticoagulant                        or antiplatelet agents. ASA Grade Assessment: II - A                        patient with mild systemic disease. After reviewing                        the risks and benefits, the patient was deemed in                        satisfactory condition to undergo the procedure.                        After I obtained informed consent, the scope was                        passed under direct vision. Throughout the procedure,                        the patient's blood pressure, pulse, and oxygen                        saturations were monitored continuously. The adult                        colonoscope was introduced through the anus and                        advanced to the terminal ileum. The colonoscopy was                        performed without difficulty. The patient tolerated                        the procedure well. The quality of the bowel                        preparation was good. The quality of the bowel                        preparation was evaluated using  the BBPS ([place] Bowel                        Preparation Scale) with scores of: Right Colon = 3                        (entire mucosa seen well with no residual staining,                        small fragments of stool or opaque liquid), Transverse                        Colon = 3 (entire mucosa seen well with no residual                        staining, small fragments of stool or opaque liquid)                        and Left Colon = 2 (minor amount of residual staining,                        small fragments of stool and/or opaque liquid, but                        mucosa seen well). The total BBPS score equals 8. The                        terminal ileum, ileocecal valve, appendiceal orifice,                        and rectum were photographed. Findings:      Three sessile polyps were found in the descending colon and splenic      flexure. The polyps were 3 to 4 mm in size. These polyps were removed      with a cold biopsy forceps. Resection and retrieval were complete.      Multiple medium-mouthed diverticula were found in the sigmoid colon.      Non-bleeding external and internal hemorrhoids were found during      retroflexion. The hemorrhoids were small.      The digital rectal exam was normal. Pertinent negatives include no      palpable rectal lesions. Estimated Blood Loss:      Estimated blood loss was minimal. Impression:            - Three 3 to 4 mm polyps in the descending colon and                        at the splenic flexure, removed with a cold biopsy                        forceps. Resected and retrieved.                        - Diverticulosis in the sigmoid colon.                        - Non-bleeding external and internal hemorrhoids. Recommendation:        - Discharge patient to home.                        - Await pathology results.                        - Repeat colonoscopy in 3 - 5 years for surveillance                        based on pathology results.                        - Patient  has a contact number available for                        emergencies. The signs and symptoms of potential                        delayed complications were discussed with the patient.                        Return to normal activities tomorrow. Written                        discharge instructions were provided to the patient.                        - Resume previous diet.                        - Continue present medications.                        - Return to referring physician. Procedure Code(s):     --- Professional ---                        51018, Colonoscopy, flexible; with biopsy, single or                        multiple Diagnosis Code(s):     --- Professional ---                        Z12.11, Encounter for screening for malignant neoplasm                        of colon                        Z86.010, Personal history of colonic polyps                        D12.4, Benign neoplasm of descending colon                        D12.3, Benign neoplasm of transverse colon (hepatic                        flexure or splenic flexure)                        K64.8, Other hemorrhoids                        K57.30, Diverticulosis of large intestine without                        perforation or abscess without bleeding CPT copyright 2021 [employer]. All rights reserved. The codes documented in this report are preliminary and upon  review may be revised to meet current compliance requirements. Attending Participation:      I personally performed the entire procedure. MD Mendel Sylvester MD 9/25/2023 9:39:09 AM This report has been signed electronically. Number of Addenda: 0 Note Initiated On: 9/25/2023 8:33 AM Scope Withdrawal Time 0 hours 16 minutes 45 seconds Total Procedure Duration Time 0 hours 19 minutes 48 seconds             Assessment/Plan   ASSESSMENT & PLAN:    #pre tibial abscess, left leg  #cellulitis, left leg  #diabetes mellitus  - Patient was seen and evaluated;  all findings were discussed and all questions were answered to patient's satisfaction.  - Charts, labs, vitals and imaging all reviewed.   - Imaging: XR left tib/fib done at urgent care on 10/13/23 demonstrated Benign-appearing cortical thinning noted along the left posterior tibial.   - Imaging CT left tib/fib: 9.5 cm x 5.7 cm x 1.3 cm abscess in the upper pretibial soft  tissues extending from the mid patellar tendon to the upper tibial  diaphysis, abuts the tibial tuberosity and fascia of the anterior  compartment musculature in contains a tiny amount of internal gas  locules that may be due to the fact that it drains to the skin  surface or attempted drainage. No deep compartment involvement, no  evidence of myositis, necrotizing fasciitis, or osteomyelitis.  - Labs: WBC 19.6, lactate 1.7  - Wound culture: collected bedside, pending  - Blood culture: pending, NGTD    Plan:  - Abx: vanco+zosyn  - Pain Regimen: per primary  - Patient reports bedside I&D done bedside 10/16/23 with purulence expressed. Upon consult exam today more purulence and large pre tibial abscess visualized. Patient necessitating more thorough source control.   - Due the extension of the abscess proximal from the tibial tuberosity, recommend consulting general surgery for management of the abscess  - Dressings: betadine paint, DSD  - Nursing staff is able to change/reinforce dressing if & as necessary until next day’s dressing change. Thank you.  - Podiatry will follow peripherally    DVT ppx: per primary  Weightbearing: WBAT      Case to be discussed with attending, A&P above reflects a tentative plan. Please await for the final signature from the attending physician on service.    Gris Ramos DPM PGY-2  Podiatric Medicine & Surgery  Please Jamie message me with any questions or concerns.            SIGNATURE: Gris Ramos DPM PATIENT NAME: Shoaib Cheng   DATE: October 17, 2023 MRN: [id number]   TIME: 2:34 PM CONTACT: Haiku message

## 2023-10-17 NOTE — PROGRESS NOTES
Pharmacy Medication History Review    Shoaib Cheng is a 63 y.o. male admitted for Abscess of left leg. Pharmacy reviewed the patient's [id number] medications and allergies for accuracy.    The list below reflectives the updated PTA list. Please review each medication in order reconciliation for additional clarification and justification.  Medications Prior to Admission   Medication Sig Dispense Refill Last Dose    Accu-Chek Guide Me Glucose Mtr misc test once daily   Past Month    Accu-Chek Guide test strips strip TEST daily   Past Month    Accu-Chek Softclix Lancets misc use 1 LANCET to TEST BLOOD SUGAR once daily   Past Month    metFORMIN XR (Glucophage-XR) 750 mg 24 hr tablet Take 1 tablet (750 mg) by mouth once daily in the evening. Take with meals. Do not crush, chew, or split. 30 tablet 11 10/16/2023    sildenafil (Viagra) 50 mg tablet Take 1 tablet (50 mg) by mouth once daily as needed for erectile dysfunction.   More than a month    tamsulosin (Flomax) 0.4 mg 24 hr capsule Take 1 capsule (0.4 mg) by mouth once daily in the morning. Take before meals.   10/13/2023        The list below reflectives the updated allergy list. Please review each documented allergy for additional clarification and justification.  Allergies  Reviewed by Joselin Morel RN on 10/17/2023   No Known Allergies         Below are additional concerns with the patient's PTA list:  - Patient states he stopped taking his flomax when he injured his leg due to concern for interaction however should be taking.    LUKE SHAFFER, Marques

## 2023-10-17 NOTE — PROGRESS NOTES
"Vancomycin Dosing by Pharmacy- INITIAL    Shoaib Cheng is a 63 y.o. year old male who Pharmacy has been consulted for vancomycin dosing for cellulitis, skin and soft tissue. Based on the patient's indication and renal status this patient will be dosed based on a goal AUC of 400-600.     Renal function is currently stable.    Visit Vitals  /84   Pulse 89   Temp 36.8 °C (98.2 °F) (Oral)   Resp 16        Lab Results   Component Value Date    CREATININE 0.83 10/16/2023    CREATININE 1.06 08/08/2023    CREATININE 0.87 07/06/2022        Patient weight is 103 kg.    No results found for: \"CULTURE\"     No intake/output data recorded.  [unfilled]    No results found for: \"PATIENTTEMP\"     Assessment/Plan     Patient has already been given a loading dose of 1,500 mg.  Will initiate vancomycin maintenance,  1,250 mg every 12 hours.    This dosing regimen is predicted by [employer] to result in the following pharmacokinetic parameters:    AUC24,ss: 517 mg/L.hr  Probability of AUC24 > 400: 76 %  Ctrough,ss: 16.4 mg/L  Probability of Ctrough,ss > 20: 34 %  Probability of nephrotoxicity (Lodise BRANDON 2009): 12 %    Follow-up level will be ordered on 10/18/23 at 05:00 unless clinically indicated sooner.  Will continue to monitor renal function daily while on vancomycin and order serum creatinine at least every 48 hours if not already ordered.  Follow for continued vancomycin needs, clinical response, and signs/symptoms of toxicity.     Magali Evangelista, PharmD       "

## 2023-10-17 NOTE — NURSING NOTE
Pt A/Ox4, pleasant, and able to follow POC.  Pt welcomed to [facility]. He is educated on tv, phone, bed, remote, and pt's rights and responsibilities. No additional questions at this time.

## 2023-10-17 NOTE — CARE PLAN
Problem: Pain  Goal: Takes deep breaths with improved pain control throughout the shift  Outcome: Progressing  Goal: Turns in bed with improved pain control throughout the shift  Outcome: Progressing  Goal: Walks with improved pain control throughout the shift  Outcome: Progressing  Goal: Performs ADL's with improved pain control throughout shift  Outcome: Progressing  Goal: Participates in PT with improved pain control throughout the shift  Outcome: Progressing  Goal: Free from opioid side effects throughout the shift  Outcome: Progressing  Goal: Free from acute confusion related to pain meds throughout the shift  Outcome: Progressing     Problem: Infection related to problem list condition  Goal: Infection will resolve through treatment  Outcome: Progressing   The patient's goals for the shift include to be admitted    The clinical goals for the shift include keep pain at minimal and get rid of infection

## 2023-10-17 NOTE — H&P
History Of Present Illness  Shoaib Cheng is a 63 y.o. male presenting with wound to LLE wound x 9 days. Wound sustained 10/7 in [place] after slipping on pool stair and scraping L leg. Salt water private pool. Denies injury elsewhere. Rested, iced, elevated leg following week with some relief but still noted increasing swelling, redness, pain. Friday 10/14 went to urgent care due to severe pain. Prescribed keflex, took 2-3 days, subsequently resented to ED 10/16 due to worsening pain, redness, swelling despite antibiotic. I&Ded in ED. Did note at some point during week, wound opened and drained clear, yellow fluid. Ambulating ok but painful. Denies f/c, CP, SOB, dyspnea, n/v, changes in stool or urine.     Past Medical History  Past Medical History:   Diagnosis Date    Benign prostatic hyperplasia     Type II diabetes mellitus (CMS/HCC)        Surgical History  Past Surgical History:   Procedure Laterality Date    EXPOSURE OF IMPACTED TOOTH      TONSILLECTOMY         Social History  Social History     Socioeconomic History    Marital status:      Spouse name: None    Number of children: None    Years of education: None    Highest education level: None   Occupational History    None   Tobacco Use    Smoking status: Never    Smokeless tobacco: None   Substance and Sexual Activity    Alcohol use: Yes     Alcohol/week: 1.0 standard drink of alcohol     Types: 1 Glasses of wine per week    Drug use: Never    Sexual activity: None   Other Topics Concern    None   Social History Narrative    None     Social Determinants of Health     Financial Resource Strain: Low Risk  (10/17/2023)    Overall Financial Resource Strain (CARDIA)     Difficulty of Paying Living Expenses: Not hard at all   Food Insecurity: Not on file   Transportation Needs: No Transportation Needs (10/17/2023)    PRAPARE - Transportation     Lack of Transportation (Medical): No     Lack of Transportation (Non-Medical): No   Physical Activity: Not on  file   Stress: Not on file   Social Connections: Not on file   Intimate Partner Violence: Not on file   Housing Stability: Low Risk  (10/17/2023)    Housing Stability Vital Sign     Unable to Pay for Housing in the Last Year: No     Number of Places Lived in the Last Year: 1     Unstable Housing in the Last Year: No        Family History  No family history on file.     Allergies  Allergies as of 10/16/2023    (No Known Allergies)        Review of Systems  Review of Systems   Constitutional:  Negative for chills and fever.   HENT: Negative.     Eyes: Negative.    Respiratory:  Negative for cough and shortness of breath.    Cardiovascular:  Negative for chest pain.   Gastrointestinal:  Negative for abdominal pain, blood in stool, diarrhea, nausea and vomiting.   Endocrine: Negative.    Genitourinary:  Negative for dysuria and hematuria.   Musculoskeletal:  Positive for myalgias.        Pain associated with abscess   Skin:  Positive for color change and wound.        Wound below left knee, bandaged, surrounding erythema   Allergic/Immunologic: Negative.    Neurological: Negative.  Negative for dizziness, syncope, weakness, light-headedness and numbness.   Hematological: Negative.    Psychiatric/Behavioral: Negative.         Relevant results reviewed   PROVIDER notes  Nursing notes  MEDS:  Current Facility-Administered Medications   Medication Dose Route Frequency Provider Last Rate Last Admin    acetaminophen (Tylenol) tablet 975 mg  975 mg oral q6h PRN Jory Conde PA-C        dextrose 10 % in water (D10W) infusion  0.3 g/kg/hr intravenous Once PRN Jory Conde PA-C        dextrose 50 % injection 25 g  25 g intravenous q15 min PRN Jory Conde PA-C        docusate sodium (Colace) capsule 100 mg  100 mg oral BID Jory Conde PA-C        enoxaparin (Lovenox) syringe 40 mg  40 mg subcutaneous q24h Jory Conde PA-C        glucagon (Glucagen) injection 1 mg  1 mg intramuscular q15 min PRN Jory RODRIGUEZ  DEWAYNE Conde        insulin lispro (HumaLOG) injection 0-10 Units  0-10 Units subcutaneous Before meals & nightly Jory Conde PA-C        lidocaine (Xylocaine) 10 mg/mL (1 %) injection  - Omnicell Override Pull     Francisco Johnson MD        melatonin tablet 3 mg  3 mg oral Daily Jory Conde PA-C        morphine injection 2 mg  2 mg intravenous q4h PRN Jory Conde PA-C        oxyCODONE (Roxicodone) immediate release tablet 5 mg  5 mg oral q6h PRN Jory Conde PA-C        pantoprazole (ProtoNix) EC tablet 40 mg  40 mg oral Daily before breakfast Jory Conde PA-C        Or    pantoprazole (ProtoNix) injection 40 mg  40 mg intravenous Daily before breakfast Jory Conde PA-C        piperacillin-tazobactam-dextrose (Zosyn) IV 4.5 g  4.5 g intravenous q6h Jory Conde PA-C        vancomycin (Vancocin) in dextrose 5 % water (D5W) 250 mL IV 1,250 mg  1,250 mg intravenous q12h Jory Conde PA-C          LABS:  Results for orders placed or performed during the hospital encounter of 10/16/23 (from the past 24 hour(s))   CBC and Auto Differential   Result Value Ref Range    WBC 21.5 (H) 4.4 - 11.3 x10*3/uL    nRBC 0.0 0.0 - 0.0 /100 WBCs    RBC 4.06 (L) 4.50 - 5.90 x10*6/uL    Hemoglobin 12.8 (L) 13.5 - 17.5 g/dL    Hematocrit 36.4 (L) 41.0 - 52.0 %    MCV 90 80 - 100 fL    MCH 31.5 26.0 - 34.0 pg    MCHC 35.2 32.0 - 36.0 g/dL    RDW 12.2 11.5 - 14.5 %    Platelets 395 150 - 450 x10*3/uL    MPV 9.4 7.5 - 11.5 fL    Neutrophils % 77.1 40.0 - 80.0 %    Immature Granulocytes %, Automated 2.9 (H) 0.0 - 0.9 %    Lymphocytes % 11.4 13.0 - 44.0 %    Monocytes % 6.7 2.0 - 10.0 %    Eosinophils % 1.1 0.0 - 6.0 %    Basophils % 0.8 0.0 - 2.0 %    Neutrophils Absolute 16.59 (H) 1.20 - 7.70 x10*3/uL    Immature Granulocytes Absolute, Automated 0.63 0.00 - 0.70 x10*3/uL    Lymphocytes Absolute 2.46 1.20 - 4.80 x10*3/uL    Monocytes Absolute 1.44 (H) 0.10 - 1.00 x10*3/uL    Eosinophils Absolute 0.23 0.00  - 0.70 x10*3/uL    Basophils Absolute 0.17 (H) 0.00 - 0.10 x10*3/uL   Comprehensive Metabolic Panel   Result Value Ref Range    Glucose 367 (H) 74 - 99 mg/dL    Sodium 130 (L) 136 - 145 mmol/L    Potassium 4.4 3.5 - 5.3 mmol/L    Chloride 95 (L) 98 - 107 mmol/L    Bicarbonate 26 21 - 32 mmol/L    Anion Gap 13 10 - 20 mmol/L    Urea Nitrogen 17 6 - 23 mg/dL    Creatinine 0.83 0.50 - 1.30 mg/dL    eGFR >90 >60 mL/min/1.73m*2    Calcium 9.0 8.6 - 10.3 mg/dL    Albumin 3.9 3.4 - 5.0 g/dL    Alkaline Phosphatase 305 (H) 33 - 136 U/L    Total Protein 7.4 6.4 - 8.2 g/dL    AST 26 9 - 39 U/L    Bilirubin, Total 0.7 0.0 - 1.2 mg/dL    ALT 67 (H) 10 - 52 U/L   Lactate   Result Value Ref Range    Lactate 1.7 0.4 - 2.0 mmol/L   Blood Culture    Specimen: Peripheral Venipuncture; Blood culture   Result Value Ref Range    Blood Culture Loaded on Instrument - Culture in progress    Blood Culture    Specimen: Peripheral Venipuncture; Blood culture   Result Value Ref Range    Blood Culture Loaded on Instrument - Culture in progress    Coagulation Screen   Result Value Ref Range    Protime 15.9 (H) 9.8 - 12.8 seconds    INR 1.4 (H) 0.9 - 1.1    aPTT 32 27 - 38 seconds   Urinalysis with Reflex Microscopic and Culture   Result Value Ref Range    Color, Urine Yellow Straw, Yellow    Appearance, Urine Clear Clear    Specific Gravity, Urine 1.040 (N) 1.005 - 1.035    pH, Urine 6.0 5.0, 5.5, 6.0, 6.5, 7.0, 7.5, 8.0    Protein, Urine NEGATIVE NEGATIVE mg/dL    Glucose, Urine >=500 (3+) (A) NEGATIVE mg/dL    Blood, Urine NEGATIVE NEGATIVE    Ketones, Urine NEGATIVE NEGATIVE mg/dL    Bilirubin, Urine NEGATIVE NEGATIVE    Urobilinogen, Urine <2.0 <2.0 mg/dL    Nitrite, Urine NEGATIVE NEGATIVE    Leukocyte Esterase, Urine NEGATIVE NEGATIVE   CBC and Auto Differential   Result Value Ref Range    WBC 19.6 (H) 4.4 - 11.3 x10*3/uL    nRBC 0.0 0.0 - 0.0 /100 WBCs    RBC 3.68 (L) 4.50 - 5.90 x10*6/uL    Hemoglobin 11.4 (L) 13.5 - 17.5 g/dL     Hematocrit 33.7 (L) 41.0 - 52.0 %    MCV 92 80 - 100 fL    MCH 31.0 26.0 - 34.0 pg    MCHC 33.8 32.0 - 36.0 g/dL    RDW 12.4 11.5 - 14.5 %    Platelets 345 150 - 450 x10*3/uL    MPV 9.4 7.5 - 11.5 fL    Neutrophils % 73.9 40.0 - 80.0 %    Immature Granulocytes %, Automated 2.4 (H) 0.0 - 0.9 %    Lymphocytes % 14.0 13.0 - 44.0 %    Monocytes % 7.6 2.0 - 10.0 %    Eosinophils % 1.3 0.0 - 6.0 %    Basophils % 0.8 0.0 - 2.0 %    Neutrophils Absolute 14.50 (H) 1.20 - 7.70 x10*3/uL    Immature Granulocytes Absolute, Automated 0.47 0.00 - 0.70 x10*3/uL    Lymphocytes Absolute 2.75 1.20 - 4.80 x10*3/uL    Monocytes Absolute 1.49 (H) 0.10 - 1.00 x10*3/uL    Eosinophils Absolute 0.25 0.00 - 0.70 x10*3/uL    Basophils Absolute 0.16 (H) 0.00 - 0.10 x10*3/uL      IMAGING:  CT femur left w IV contrast   Final Result   1. 9.5 cm x 5.7 cm x 1.3 cm abscess in the upper pretibial soft   tissues extending from the mid patellar tendon to the upper tibial   diaphysis, abuts the tibial tuberosity and fascia of the anterior   compartment musculature in contains a tiny amount of internal gas   locules that may be due to the fact that it drains to the skin   surface or attempted drainage. No deep compartment involvement, no   evidence of myositis, necrotizing fasciitis, or osteomyelitis.        2. Cellulitis diffusely involving the left leg throughout its entire   length, most severe at the level of the abscess. There is also milder   cellulitis of the thyroid is circumferential distally and involves   the medial and anteromedial aspects of the mid proximal left thigh.        MACRO:   None.        Signed by: Lamin Hampton 10/17/2023 1:03 AM   Dictation workstation:   [id number]      CT tibia fibula left w IV contrast   Final Result   1. 9.5 cm x 5.7 cm x 1.3 cm abscess in the upper pretibial soft   tissues extending from the mid patellar tendon to the upper tibial   diaphysis, abuts the tibial tuberosity and fascia of the anterior  "  compartment musculature in contains a tiny amount of internal gas   locules that may be due to the fact that it drains to the skin   surface or attempted drainage. No deep compartment involvement, no   evidence of myositis, necrotizing fasciitis, or osteomyelitis.        2. Cellulitis diffusely involving the left leg throughout its entire   length, most severe at the level of the abscess. There is also milder   cellulitis of the thyroid is circumferential distally and involves   the medial and anteromedial aspects of the mid proximal left thigh.        MACRO:   None.        Signed by: Lamin Hampton 10/17/2023 1:03 AM   Dictation workstation:   [id number]      Lower extremity venous duplex left   Final Result   No evidence of acute DVT in the left lower extremity.        MACRO:   None.        Signed by: Lamin Hampton 10/16/2023 6:57 PM   Dictation workstation:   [id number]             PHYSICAL EXAM  /85 (BP Location: Right arm, Patient Position: Lying)   Pulse 78   Temp 36.6 °C (97.9 °F) (Temporal)   Resp 17   Ht 1.803 m (5' 11\")   Wt 103 kg (226 lb)   SpO2 99%   BMI 31.52 kg/m²   PHYSICAL EXAM:  GENERAL: Alert, NAD, cooperative  SKIN: Bandaged wound, deferred direct observation of wound. Warm and dry.    HEENT:  NCAT, PERRLA, EOMI, nonicteric sclera, MMM, neck supple, trachea midline  LUNGS: Unlabored, CTAB, no significant wheezing, rhonchi, or rales appreciated  CARDS: RRR, no m/g/r appreciated  GI: Soft, NTND, BS+, no rebound, no guarding   : no baker, voids independently   MS/Extremities: Bandaged wound below L knee with surrounding edema, erythema, warmth, tenderness. Erythema extends inferiorly approximately mid shin, minimal extension superiorly above bandage ending at patella. Erythema marked. B/L hands no evidence of thromboembolic disease. WWP, distal pulses intact, moves all extremities.  NEURO: A&Ox3, grossly nonfocal exam, speech fluent, follows commands, answers questions " appropriately  PSYCH: mood and behavior appropriate    Assessment/Plan:   Principal Problem:    Abscess of left leg     Abscess, cellulitis  DMII  BPH  -CBC: leukocytosis (21.5), mild anemia, no thrombocytopenia  -CMP: Hyponatremia (130) in setting of hyperglycemia (367) ---> corrected sodium = 136, no acute renal or liver dysfunction appreciated   -Lactate: 1.7, WNL  -Blood cultures x2, tissue culture in progress  -UA: unremarkable for infection  -CT L femur, tibia: 9.5 cm x 5.7 cm x 1.3 cm abscess in the upper pretibial soft  Tissues.  No deep compartment involvement, no evidence of myositis, necrotizing fasciitis, or osteomyelitis. Cellulitis diffusely involving the left leg throughout its entire length, most severe at the level of the abscess  -LE US venous duplex: unremarkable   -Zosyn, vancomycin ---> de escalation pending culture results  -ISS 2 ACHS  -NPO  -resume home meds as appropriate   -repeat labs in AM  -GI ppx: Colace. Bowel regimen  -VTE ppx: Lovenox  -Podiatry consult and recommendations appreciated      KENDRICK GuoS  Note not complete until co-signed

## 2023-10-17 NOTE — PROGRESS NOTES
10/17/23 0923   Discharge Planning   Living Arrangements Spouse/significant other   Support Systems Spouse/significant other   Assistance Needed none   Type of Residence Private residence   Do you have animals or pets at home? No   Who is requesting discharge planning? Provider   Home or Post Acute Services None   Patient expects to be discharged to: home   Does the patient need discharge transport arranged? No   Financial Resource Strain   How hard is it for you to pay for the very basics like food, housing, medical care, and heating? Not hard   Housing Stability   In the last 12 months, was there a time when you were not able to pay the mortgage or rent on time? N   In the last 12 months, was there a time when you did not have a steady place to sleep or slept in a shelter (including now)? N   Transportation Needs   In the past 12 months, has lack of transportation kept you from medical appointments or from getting medications? no   In the past 12 months, has lack of transportation kept you from meetings, work, or from getting things needed for daily living? No     I met with patient at bedside and explained tcc role. He lives in a house with his wife, ambulates independently and drives. Admitted with l leg abscess after getting a cut on it in pool in [place]. TCC to watch for ID recs. Denies any other hhc needs at this time.

## 2023-10-17 NOTE — PROCEDURES
Incision and Drainage    Date/Time: 10/17/2023 3:30 PM    Performed by: Bryan Kline DPM  Authorized by: Bryan Kline DPM    Consent:     Consent obtained:  Verbal    Consent given by:  Patient    Risks, benefits, and alternatives were discussed: yes      Risks discussed:  Bleeding, incomplete drainage, pain, damage to other organs and infection    Alternatives discussed:  No treatment, delayed treatment, alternative treatment, observation and referral  Universal protocol:     Procedure explained and questions answered to patient or proxy's satisfaction: yes      Relevant documents present and verified: yes      Test results available : yes      Imaging studies available: yes      Required blood products, implants, devices, and special equipment available: yes      Site/side marked: yes      Immediately prior to procedure, a time out was called: yes      Patient identity confirmed:  Verbally with patient  Location:     Type:  Abscess    Location:  Lower extremity    Lower extremity location:  Leg    Leg location:  L lower leg  Pre-procedure details:     Skin preparation:  Povidone-iodine  Sedation:     Sedation type:  None  Anesthesia:     Anesthesia method:  Local infiltration    Local anesthetic:  Lidocaine 1% w/o epi  Procedure type:     Complexity:  Simple  Procedure details:     Ultrasound guidance: no      Needle aspiration: no      Incision types:  Stab incision    Incision depth:  Dermal    Wound management:  Irrigated with saline and probed and deloculated    Drainage:  Purulent and bloody    Drainage amount:  Copious    Wound treatment:  Wound left open    Packing materials:  Wick placed (soaked in betadine)  Post-procedure details:     Procedure completion:  Tolerated well, no immediate complications  Comments:      Appx 30ccs of purulent drainage with moderate coagulated hematogenous fluid

## 2023-10-17 NOTE — PROGRESS NOTES
Shoaib Cheng is a 63 y.o. male who presented to ed on 10/16/2023 presenting with Abscess of left leg.    Subjective    Resting in bed.  No apparent distress.  Reports no further edema.  Leg pain has lessened since starting IV antibiotics.    Review of systems   Negative except as noted above        Objective   Physical Exam   GENERAL: Alert, NAD, cooperative  SKIN: Bandaged wound, deferred direct observation of wound. Warm and dry.    HEENT:  NCAT, PERRLA, EOMI, nonicteric sclera, MMM, neck supple, trachea midline  LUNGS: Unlabored, CTAB, no significant wheezing, rhonchi, or rales appreciated  CARDS: RRR, no m/g/r appreciated  GI: Soft, NTND, BS+, no rebound, no guarding   : no baker, voids independently   MS/Extremities: Bandaged wound below L knee with surrounding edema, erythema, warmth, tenderness. Erythema extends inferiorly approximately mid shin, minimal extension superiorly above bandage ending at patella to left groin. B/L hands no evidence of thromboembolic disease. WWP, distal pulses intact, moves all extremities.  NEURO: A&Ox3, grossly nonfocal exam, speech fluent, follows commands, answers questions appropriately  PSYCH: mood and behavior appropriate    Last Recorded Vitals  /69 (Patient Position: Lying)   Pulse 84   Temp 36.4 °C (97.5 °F) (Temporal)   Resp 16   Wt 103 kg (226 lb)   SpO2 97%   Intake/Output last 3 Shifts:    Intake/Output Summary (Last 24 hours) at 10/17/2023 1655  Last data filed at 10/17/2023 1126  Gross per 24 hour   Intake --   Output 600 ml   Net -600 ml     Admission Weight  Weight: 103 kg (226 lb) (10/16/23 1735)  Daily Weight  10/17/23 : 103 kg (226 lb)      Medications   Scheduled medications  docusate sodium, 100 mg, oral, BID  enoxaparin, 40 mg, subcutaneous, q24h  insulin lispro, 0-10 Units, subcutaneous, Before meals & nightly  melatonin, 3 mg, oral, Daily  pantoprazole, 40 mg, oral, Daily before breakfast   Or  pantoprazole, 40 mg, intravenous, Daily before  breakfast  piperacillin-tazobactam, 3.375 g, intravenous, q6h  tamsulosin, 0.4 mg, oral, Daily before breakfast  vancomycin, 1,250 mg, intravenous, q12h      Continuous medications     PRN medications  PRN medications: acetaminophen, dextrose 10 % in water (D10W), dextrose, glucagon, morphine, oxyCODONE     Relevant Results  Image Results  CT femur left w IV contrast, CT tibia fibula left w IV contrast  Narrative: Interpreted By:  Lamin Hampton,   STUDY:  CT TIBIA FIBULA LEFT W IV CONTRAST; CT FEMUR LEFT W IV CONTRAST;  10/17/2023 12:09 am      INDICATION:  Signs/Symptoms:LLE pretibial abscess; Signs/Symptoms:LLE Pretibial  abscess.  Please obatin CT LLE w/ IV contrast.  No need for imaging  of RLE..      COMPARISON:  None.      ACCESSION NUMBER(S):  [id number]; [id number]      ORDERING CLINICIAN:  GUNJAN STACK      TECHNIQUE:  CT imaging of the left femur and tibia/fibula was obtained after the  intravenous administration of 75 mL Omnipaque 350 contrast. Coronal  and sagittal reformatted images were performed. 3D reconstructed  images were not performed at time of dictation therefore not used  during interpretation.      FINDINGS:  OSSEOUS STRUCTURES:      There is no acute fracture of the left femur or left tibia/fibula.  There is normal alignment of the left hip, knee, and ankle. There is  mild left hip osteoarthrosis without joint effusion. There is mild  medial and patellofemoral compartment osteoarthrosis of the left knee  without significant joint effusion. There is no significant  degenerative change or effusion of the left tibiotalar joint.  Enthesopathic spurring at the Achilles tendon insertion.  There is no  osseous destruction or erosive change throughout the left lower  extremity.          SOFT TISSUES:      There is a 9.5 cm x 5.7 cm x 1.3 cm rim enhancing fluid collection  consistent with abscess. This extends from the mid patellar tendon to  the proximal tibial diaphysis (sagittal image  77/149). It abuts the  fascia of the anterior compartment musculature (axial image 299/497,  series 606), abuts the tibial tuberosity, and drains to the skin  surface. There is diffuse thickening of the skin in the mid to  proximal left leg. There is diffuse subcutaneous edema involving the  entire left leg that is most severe at the level of the abscess.  There is also subcutaneous edema of the left thigh that is  circumferential distally and located along the medial and  anteromedial aspects slightly more proximally. There is no evidence  of deep compartment infection. There is no soft tissue gas except for  few foci of gas within the abscess.      There are reactive enlarged left inguinal lymph nodes measuring up to  1.4 cm.          OTHER:      The prostate gland is enlarged, measuring 5.1 cm in transverse  dimension. There is diverticulosis of the partially visualized  sigmoid colon. Incidental note of small bilateral hydroceles.      Impression: 1. 9.5 cm x 5.7 cm x 1.3 cm abscess in the upper pretibial soft  tissues extending from the mid patellar tendon to the upper tibial  diaphysis, abuts the tibial tuberosity and fascia of the anterior  compartment musculature in contains a tiny amount of internal gas  locules that may be due to the fact that it drains to the skin  surface or attempted drainage. No deep compartment involvement, no  evidence of myositis, necrotizing fasciitis, or osteomyelitis.      2. Cellulitis diffusely involving the left leg throughout its entire  length, most severe at the level of the abscess. There is also milder  cellulitis of the thyroid is circumferential distally and involves  the medial and anteromedial aspects of the mid proximal left thigh.      MACRO:  None.      Signed by: Lamin Hampton 10/17/2023 1:03 AM  Dictation workstation:   [id number]        Assessment/Plan   Shoaib Cheng is a 63 y.o. male presenting with wound to LLE wound x 9 days. Wound sustained 10/7 in  [place] after slipping on pool stair and scraping L leg. Salt water private pool. Denies injury elsewhere. Rested, iced, elevated leg following week with some relief but still noted increasing swelling, redness, pain. Friday 10/14 went to urgent care due to severe pain. Prescribed keflex, took 2-3 days, subsequently resented to ED 10/16 due to worsening pain, redness, swelling despite antibiotic. I&Ded in ED. Did note at some point during week, wound opened and drained clear, yellow fluid. Ambulating ok but painful. Denies f/c, CP, SOB, dyspnea, n/v, changes in stool or urine.      Principal Problem:    Abscess of left leg  cellulitis  DMII  BPH  Sepsis watch   -CBC: leukocytosis (21.5 -> 19.6) mild anemia, no thrombocytopenia  -CMP: Hyponatremia (130) in setting of hyperglycemia (367) ---> corrected sodium = 136, no acute renal or liver dysfunction appreciated   -Lactate: 1.7  -Blood cultures x2, tissue culture in progress  -UA: unremarkable for infection  -CT L femur, tibia: 9.5 cm x 5.7 cm x 1.3 cm abscess in the upper pretibial soft  Tissues.  No deep compartment involvement, no evidence of myositis, necrotizing fasciitis, or osteomyelitis. Cellulitis diffusely involving the left leg throughout its entire length, most severe at the level of the abscess  -LE US venous duplex: unremarkable   -Zosyn, vancomycin ---> de escalation pending culture results  -ISS 2 ACHS  -NPO pending possible surgical washout  -resume home meds as appropriate   -Podiatry consult and recommendations appreciated -- rec stat gen surg consult.  - wound culture sent     Dvt prophylaxis   - lovenox  - scd/ambulate     Gi prophylaxis   - pantopraxole   - miralax     DC plan  - DC home when medically stable    Labs/Testing reviewed:   Interdisciplinary team rounding completed with hospitalist, nurse, TCC  NP discussed plan & lab/testing results with Dr. french  --- pending podiatry consult/surg consult   45 min spent on professional & overall  care of this patient    Suzy Leonardo, APRN-CNP

## 2023-10-17 NOTE — CONSULTS
Reason For Consult  Leg abscess/celluitis    History Of Present Illness  Shoaib Cheng is a 63 y.o. male presenting with leg leg abscess and associated cellulitis. Per patient, on 10/7 he slipped and struck/scrapped his left knee onto the edge of a pool while in [place]. He did not have an issue initially until the next few days, where he noticed increased redness and pain. At this time he went to urgent care on 10/14, where he was prescribed Keflex. The erythema and pain got worse to the pain where he went to the ED and was admitted. CT found a 9.5x5.7x1.3 cm abscess in the upper pretibial soft tissues without deep compartment involvement/osteomyelitis. Podiatry saw last night and performed a bedside I&D and expressed a good amount or purulency. Patient and family stated that since then the erythema has gotten better and the incision is still expressing purulency. General surgery was consulted for the further management of his left leg abscess and cellulitis.     Past Medical History  He has a past medical history of Benign prostatic hyperplasia and Type II diabetes mellitus (CMS/[facility]).    Surgical History  He has a past surgical history that includes Tonsillectomy and Exposure of impacted tooth.     Social History  He reports that he has never smoked. He does not have any smokeless tobacco history on file. He reports current alcohol use of about 1.0 standard drink of alcohol per week. He reports that he does not use drugs.    Family History  No family history on file.     Allergies  Patient has no known allergies.    Review of Systems  Review of Systems      Physical Exam  .Physical Exam  Constitutional:       Appearance: Normal appearance.   Eyes:      Extraocular Movements: Extraocular movements intact.      Pupils: Pupils are equal, round, and reactive to light.   Cardiovascular:      Rate and Rhythm: Normal rate and regular rhythm.      Pulses: Normal pulses.      Heart sounds: Normal heart sounds.   Pulmonary:  "     Effort: Pulmonary effort is normal. No respiratory distress.      Breath sounds: Normal breath sounds.   Abdominal:      General: Abdomen is flat.      Palpations: Abdomen is soft.   Musculoskeletal:         General: Swelling and tenderness present. Normal range of motion.      Comments: Left leg with approximately 2cm incision at mid/upper anterior shin, expressing purulent fluid. Erythema around anterior upper to middle of shin.   Skin:     General: Skin is warm and dry.      Capillary Refill: Capillary refill takes less than 2 seconds.   Neurological:      General: No focal deficit present.      Mental Status: He is alert and oriented to person, place, and time.   Psychiatric:         Mood and Affect: Mood normal.         Behavior: Behavior normal.         Thought Content: Thought content normal.         Judgment: Judgment normal.             Last Recorded Vitals  Blood pressure 116/69, pulse 84, temperature 36.4 °C (97.5 °F), temperature source Temporal, resp. rate 16, height 1.803 m (5' 11\"), weight 103 kg (226 lb), SpO2 97 %.    Relevant Results  .Scheduled medications  docusate sodium, 100 mg, oral, BID  enoxaparin, 40 mg, subcutaneous, q24h  insulin lispro, 0-10 Units, subcutaneous, Before meals & nightly  melatonin, 3 mg, oral, Daily  pantoprazole, 40 mg, oral, Daily before breakfast   Or  pantoprazole, 40 mg, intravenous, Daily before breakfast  piperacillin-tazobactam, 3.375 g, intravenous, q6h  tamsulosin, 0.4 mg, oral, Daily before breakfast  vancomycin, 1,250 mg, intravenous, q12h      Continuous medications     PRN medications  PRN medications: acetaminophen, dextrose 10 % in water (D10W), dextrose, glucagon, morphine, oxyCODONE    .  Results for orders placed or performed during the hospital encounter of 10/16/23 (from the past 24 hour(s))   CBC and Auto Differential   Result Value Ref Range    WBC 21.5 (H) 4.4 - 11.3 x10*3/uL    nRBC 0.0 0.0 - 0.0 /100 WBCs    RBC 4.06 (L) 4.50 - 5.90 x10*6/uL "    Hemoglobin 12.8 (L) 13.5 - 17.5 g/dL    Hematocrit 36.4 (L) 41.0 - 52.0 %    MCV 90 80 - 100 fL    MCH 31.5 26.0 - 34.0 pg    MCHC 35.2 32.0 - 36.0 g/dL    RDW 12.2 11.5 - 14.5 %    Platelets 395 150 - 450 x10*3/uL    MPV 9.4 7.5 - 11.5 fL    Neutrophils % 77.1 40.0 - 80.0 %    Immature Granulocytes %, Automated 2.9 (H) 0.0 - 0.9 %    Lymphocytes % 11.4 13.0 - 44.0 %    Monocytes % 6.7 2.0 - 10.0 %    Eosinophils % 1.1 0.0 - 6.0 %    Basophils % 0.8 0.0 - 2.0 %    Neutrophils Absolute 16.59 (H) 1.20 - 7.70 x10*3/uL    Immature Granulocytes Absolute, Automated 0.63 0.00 - 0.70 x10*3/uL    Lymphocytes Absolute 2.46 1.20 - 4.80 x10*3/uL    Monocytes Absolute 1.44 (H) 0.10 - 1.00 x10*3/uL    Eosinophils Absolute 0.23 0.00 - 0.70 x10*3/uL    Basophils Absolute 0.17 (H) 0.00 - 0.10 x10*3/uL   Comprehensive Metabolic Panel   Result Value Ref Range    Glucose 367 (H) 74 - 99 mg/dL    Sodium 130 (L) 136 - 145 mmol/L    Potassium 4.4 3.5 - 5.3 mmol/L    Chloride 95 (L) 98 - 107 mmol/L    Bicarbonate 26 21 - 32 mmol/L    Anion Gap 13 10 - 20 mmol/L    Urea Nitrogen 17 6 - 23 mg/dL    Creatinine 0.83 0.50 - 1.30 mg/dL    eGFR >90 >60 mL/min/1.73m*2    Calcium 9.0 8.6 - 10.3 mg/dL    Albumin 3.9 3.4 - 5.0 g/dL    Alkaline Phosphatase 305 (H) 33 - 136 U/L    Total Protein 7.4 6.4 - 8.2 g/dL    AST 26 9 - 39 U/L    Bilirubin, Total 0.7 0.0 - 1.2 mg/dL    ALT 67 (H) 10 - 52 U/L   Lactate   Result Value Ref Range    Lactate 1.7 0.4 - 2.0 mmol/L   Blood Culture    Specimen: Peripheral Venipuncture; Blood culture   Result Value Ref Range    Blood Culture Loaded on Instrument - Culture in progress    Blood Culture    Specimen: Peripheral Venipuncture; Blood culture   Result Value Ref Range    Blood Culture Loaded on Instrument - Culture in progress    Coagulation Screen   Result Value Ref Range    Protime 15.9 (H) 9.8 - 12.8 seconds    INR 1.4 (H) 0.9 - 1.1    aPTT 32 27 - 38 seconds   Urinalysis with Reflex Microscopic and Culture    Result Value Ref Range    Color, Urine Yellow Straw, Yellow    Appearance, Urine Clear Clear    Specific Gravity, Urine 1.040 (N) 1.005 - 1.035    pH, Urine 6.0 5.0, 5.5, 6.0, 6.5, 7.0, 7.5, 8.0    Protein, Urine NEGATIVE NEGATIVE mg/dL    Glucose, Urine >=500 (3+) (A) NEGATIVE mg/dL    Blood, Urine NEGATIVE NEGATIVE    Ketones, Urine NEGATIVE NEGATIVE mg/dL    Bilirubin, Urine NEGATIVE NEGATIVE    Urobilinogen, Urine <2.0 <2.0 mg/dL    Nitrite, Urine NEGATIVE NEGATIVE    Leukocyte Esterase, Urine NEGATIVE NEGATIVE   Comprehensive metabolic panel   Result Value Ref Range    Glucose 319 (H) 74 - 99 mg/dL    Sodium 132 (L) 136 - 145 mmol/L    Potassium 4.2 3.5 - 5.3 mmol/L    Chloride 96 (L) 98 - 107 mmol/L    Bicarbonate 27 21 - 32 mmol/L    Anion Gap 13 10 - 20 mmol/L    Urea Nitrogen 16 6 - 23 mg/dL    Creatinine 0.83 0.50 - 1.30 mg/dL    eGFR >90 >60 mL/min/1.73m*2    Calcium 8.5 (L) 8.6 - 10.3 mg/dL    Albumin 3.2 (L) 3.4 - 5.0 g/dL    Alkaline Phosphatase 255 (H) 33 - 136 U/L    Total Protein 6.3 (L) 6.4 - 8.2 g/dL    AST 20 9 - 39 U/L    Bilirubin, Total 0.8 0.0 - 1.2 mg/dL    ALT 53 (H) 10 - 52 U/L   CBC and Auto Differential   Result Value Ref Range    WBC 19.6 (H) 4.4 - 11.3 x10*3/uL    nRBC 0.0 0.0 - 0.0 /100 WBCs    RBC 3.68 (L) 4.50 - 5.90 x10*6/uL    Hemoglobin 11.4 (L) 13.5 - 17.5 g/dL    Hematocrit 33.7 (L) 41.0 - 52.0 %    MCV 92 80 - 100 fL    MCH 31.0 26.0 - 34.0 pg    MCHC 33.8 32.0 - 36.0 g/dL    RDW 12.4 11.5 - 14.5 %    Platelets 345 150 - 450 x10*3/uL    MPV 9.4 7.5 - 11.5 fL    Neutrophils % 73.9 40.0 - 80.0 %    Immature Granulocytes %, Automated 2.4 (H) 0.0 - 0.9 %    Lymphocytes % 14.0 13.0 - 44.0 %    Monocytes % 7.6 2.0 - 10.0 %    Eosinophils % 1.3 0.0 - 6.0 %    Basophils % 0.8 0.0 - 2.0 %    Neutrophils Absolute 14.50 (H) 1.20 - 7.70 x10*3/uL    Immature Granulocytes Absolute, Automated 0.47 0.00 - 0.70 x10*3/uL    Lymphocytes Absolute 2.75 1.20 - 4.80 x10*3/uL    Monocytes Absolute  1.49 (H) 0.10 - 1.00 x10*3/uL    Eosinophils Absolute 0.25 0.00 - 0.70 x10*3/uL    Basophils Absolute 0.16 (H) 0.00 - 0.10 x10*3/uL   POCT GLUCOSE   Result Value Ref Range    POCT Glucose 333 (H) 74 - 99 mg/dL   POCT GLUCOSE   Result Value Ref Range    POCT Glucose 292 (H) 74 - 99 mg/dL   POCT GLUCOSE   Result Value Ref Range    POCT Glucose 209 (H) 74 - 99 mg/dL     .CT femur left w IV contrast 10/17/2023  CT tibia fibula left w IV contrast 10/17/2023    Narrative  Interpreted By:  Lamin Hampton,  STUDY:  CT TIBIA FIBULA LEFT W IV CONTRAST; CT FEMUR LEFT W IV CONTRAST;  10/17/2023 12:09 am    INDICATION:  Signs/Symptoms:LLE pretibial abscess; Signs/Symptoms:LLE Pretibial  abscess.  Please obatin CT LLE w/ IV contrast.  No need for imaging  of RLE..    COMPARISON:  None.    ACCESSION NUMBER(S):  [id number]; [id number]    ORDERING CLINICIAN:  GUNJAN STACK    TECHNIQUE:  CT imaging of the left femur and tibia/fibula was obtained after the  intravenous administration of 75 mL Omnipaque 350 contrast. Coronal  and sagittal reformatted images were performed. 3D reconstructed  images were not performed at time of dictation therefore not used  during interpretation.    FINDINGS:  OSSEOUS STRUCTURES:    There is no acute fracture of the left femur or left tibia/fibula.  There is normal alignment of the left hip, knee, and ankle. There is  mild left hip osteoarthrosis without joint effusion. There is mild  medial and patellofemoral compartment osteoarthrosis of the left knee  without significant joint effusion. There is no significant  degenerative change or effusion of the left tibiotalar joint.  Enthesopathic spurring at the Achilles tendon insertion.  There is no  osseous destruction or erosive change throughout the left lower  extremity.      SOFT TISSUES:    There is a 9.5 cm x 5.7 cm x 1.3 cm rim enhancing fluid collection  consistent with abscess. This extends from the mid patellar tendon to  the proximal  tibial diaphysis (sagittal image 77/149). It abuts the  fascia of the anterior compartment musculature (axial image 299/497,  series 606), abuts the tibial tuberosity, and drains to the skin  surface. There is diffuse thickening of the skin in the mid to  proximal left leg. There is diffuse subcutaneous edema involving the  entire left leg that is most severe at the level of the abscess.  There is also subcutaneous edema of the left thigh that is  circumferential distally and located along the medial and  anteromedial aspects slightly more proximally. There is no evidence  of deep compartment infection. There is no soft tissue gas except for  few foci of gas within the abscess.    There are reactive enlarged left inguinal lymph nodes measuring up to  1.4 cm.      OTHER:    The prostate gland is enlarged, measuring 5.1 cm in transverse  dimension. There is diverticulosis of the partially visualized  sigmoid colon. Incidental note of small bilateral hydroceles.    Impression  1. 9.5 cm x 5.7 cm x 1.3 cm abscess in the upper pretibial soft  tissues extending from the mid patellar tendon to the upper tibial  diaphysis, abuts the tibial tuberosity and fascia of the anterior  compartment musculature in contains a tiny amount of internal gas  locules that may be due to the fact that it drains to the skin  surface or attempted drainage. No deep compartment involvement, no  evidence of myositis, necrotizing fasciitis, or osteomyelitis.    2. Cellulitis diffusely involving the left leg throughout its entire  length, most severe at the level of the abscess. There is also milder  cellulitis of the thyroid is circumferential distally and involves  the medial and anteromedial aspects of the mid proximal left thigh.    MACRO:  None.    Signed by: Lamin Hampton 10/17/2023 1:03 AM  Dictation workstation:   [id number]      Assessment/Plan     Impression:  Patient is a 63 year old male with a past medical history of DM2 and BPH.  Traumatic injury to his left shin with subsequent abscess and cellulitis formation. Per patient he is feeling better and the erythema has been decreasing since the I&D procedure last night. The incision continues to express purulent fluid. Patient is non-toxic appearing and is otherwise feeling well. His labs were reviewed revealing a WBC of 19.6<21.5, glucose of 319. His vital signs are stable and he is afebrile. He is on IV zosyn and vancomycin.    Plan:  - No urgent surgical intervention at this time  - Allow abscess to continue to drain  - Continue IV zosyn and vancomycin  - Measure evolution of erythema  - Trend WBC  - Supportive care    Dispo: Surgery will follow up with patient tomorrow morning.    Discussed care plan with Dr Daniel.    I spent 60 minutes in the professional and overall care of this patient.      Stu Arce PA-C      Pt seen, he had an I and D of his abscess this AM, he is feeling much better, leg still painful, but overall swelling is better as well as the redness up his thigh.      Gen - well appearing in NAD  Abd - Soft flat ND   CV - RRR  Lungs -CTA   Extr - RLE without edema, LLE wrapped, no erythema or swelling above the knee.  Erythema is receding and brawny.     Impression - LLE abscess improving with IV abx and drainage  Plan -   Pt may eat tonight  NPO for breakfast tomorrow  Will recheck in the AM if still very indurated will take to the OR for more extensive debridement, if better and still draining likely  will be ok for discharge pending cultures.

## 2023-10-17 NOTE — PROGRESS NOTES
Podiatry Progress Note      SERVICE DATE: 10/17/2023     SERVICE TIME:  1130     REASON FOR CONSULT: pre tibial abscess  REQUESTING PHYSICIAN: Elias Jean PA-C   PRIMARY CARE PHYSICIAN: Jomar Garcia MD        Subjective   HPI:  Mr. Cheng is a 63 y.o. male who presents with wound to LLE wound x 9 days. Wound sustained 10/7 in [place] after slipping on pool stair and scraping L leg. [place] water private pool. Denies injury elsewhere. Rested, iced, elevated leg following week with some relief but still noted increasing swelling, redness, pain. Friday 10/14 went to urgent care due to severe pain. Prescribed keflex, took 2-3 days, subsequently presented to ED 10/16 due to worsening pain, redness, swelling despite antibiotic. Did note at some point during week, wound opened and drained clear, yellow fluid. Ambulating ok but painful. Denies f/c, CP, SOB, dyspnea, n/v, changes in stool or urine.  .     Podiatry was consulted for pre tibial abscess. Per patient and attending provider in the observation unit, patient was seen in the ED by Dr. Kline who preformed a bedside I&D of the left leg 10/16/23. In patient podiatry consulted today for further evaluation of the pre tibial abscess.      ROS: 10-point review of systems was performed and is otherwise negative except as noted in HPI.  PMH: Reviewed/documented below.  PSH:  Noncontributory except per HPI             FH: Reviewed and noncontributory   SOCIAL:  Reviewed/documented below.  ALLERGIES: Reviewed/documented below.  MEDS: Reviewed/documented below.  VS: Reviewed/documented below.     Medical History        Past Medical History:   Diagnosis Date    Benign prostatic hyperplasia      Type II diabetes mellitus (CMS/HCC)           Surgical History         Past Surgical History:   Procedure Laterality Date    EXPOSURE OF IMPACTED TOOTH        TONSILLECTOMY             Family History   No family history on file.     Social History            Tobacco Use     Smoking status: Never   Substance Use Topics    Alcohol use: Yes       Alcohol/week: 1.0 standard drink of alcohol       Types: 1 Glasses of wine per week    Drug use: Never      Prescriptions Prior to Admission           Medications Prior to Admission   Medication Sig Dispense Refill Last Dose    Accu-Chek Guide Me Glucose Mtr misc test once daily     Past Month    Accu-Chek Guide test strips strip TEST daily     Past Month    Accu-Chek Softclix Lancets misc use 1 LANCET to TEST BLOOD SUGAR once daily     Past Month    metFORMIN XR (Glucophage-XR) 750 mg 24 hr tablet Take 1 tablet (750 mg) by mouth once daily in the evening. Take with meals. Do not crush, chew, or split. 30 tablet 11 10/16/2023    sildenafil (Viagra) 50 mg tablet Take 1 tablet (50 mg) by mouth once daily as needed for erectile dysfunction.     More than a month    tamsulosin (Flomax) 0.4 mg 24 hr capsule Take 1 capsule (0.4 mg) by mouth once daily in the morning. Take before meals.     10/13/2023                       Medications:  Scheduled Meds: docusate sodium, 100 mg, oral, BID  enoxaparin, 40 mg, subcutaneous, q24h  insulin lispro, 0-10 Units, subcutaneous, Before meals & nightly  melatonin, 3 mg, oral, Daily  pantoprazole, 40 mg, oral, Daily before breakfast   Or  pantoprazole, 40 mg, intravenous, Daily before breakfast  piperacillin-tazobactam, 3.375 g, intravenous, q6h  tamsulosin, 0.4 mg, oral, Daily before breakfast  vancomycin, 1,250 mg, intravenous, q12h        Continuous Infusions:    PRN Meds: PRN medications: acetaminophen, dextrose 10 % in water (D10W), dextrose, glucagon, morphine, oxyCODONE         Allergies as of 10/16/2023    (No Known Allergies)                     Objective   PHYSICAL EXAM:  Physical Exam Performed:      Vitals:     10/17/23 1126   BP: 116/69   Pulse: 84   Resp: 16   Temp: 36.4 °C (97.5 °F)   SpO2: 97%      Body mass index is 31.52 kg/m².     Patient is AOx3 and in no acute distress. Patient is alert and  cooperative. Sitting comfortably in bed with dressing clean, dry and intact. Heel off-loading boots in place B/L.     Vascular: Faintly palpable DP/PT pulses B/L. Moderate pitting edema noted to the LLE from the level of knee to  ankle.  Hair growth diminished B/L. CFT<5 to B/L hallux. Temperature is warm to warm from tibial tuberosity to distal digits B/L. Significant erythema present at pre-tibial and patellar tendon region of left leg extending both distally and proximally.      Musculoskeletal: Gross active and passive ROM intact to age and activity level. Moves all extremities spontaneously. No pain to palpation at feet B/L.      Neurological: Intact light touch sensation B/L. Pain stimuli intact B/L. Denies any numbness, burning or tingling.     Dermatologic: Nails 1-5 are within normal limits for thickness and length B/L. Skin appears well hydrated B/L. Web spaces 1-4 B/L are clean, dry and intact. No rashes or nodules noted B/L. No hyperkeratotic tissue noted B/L.      Wound: left anterior lower leg  Visible abscess present to the anterior leg encompassing and extending past the level of the knee  Significant purulent drainage with fibrotic slough.   No margarita-wound maceration.   Significant margarita-wound erythema.   Moderate evidence of ascending cellulitis or lymphangitis.  Significant palpable fluctuance. Mild malodor. Significant increased warmth.         LABS:         Results for orders placed or performed during the hospital encounter of 10/16/23 (from the past 24 hour(s))   CBC and Auto Differential   Result Value Ref Range     WBC 21.5 (H) 4.4 - 11.3 x10*3/uL     nRBC 0.0 0.0 - 0.0 /100 WBCs     RBC 4.06 (L) 4.50 - 5.90 x10*6/uL     Hemoglobin 12.8 (L) 13.5 - 17.5 g/dL     Hematocrit 36.4 (L) 41.0 - 52.0 %     MCV 90 80 - 100 fL     MCH 31.5 26.0 - 34.0 pg     MCHC 35.2 32.0 - 36.0 g/dL     RDW 12.2 11.5 - 14.5 %     Platelets 395 150 - 450 x10*3/uL     MPV 9.4 7.5 - 11.5 fL     Neutrophils % 77.1 40.0 -  80.0 %     Immature Granulocytes %, Automated 2.9 (H) 0.0 - 0.9 %     Lymphocytes % 11.4 13.0 - 44.0 %     Monocytes % 6.7 2.0 - 10.0 %     Eosinophils % 1.1 0.0 - 6.0 %     Basophils % 0.8 0.0 - 2.0 %     Neutrophils Absolute 16.59 (H) 1.20 - 7.70 x10*3/uL     Immature Granulocytes Absolute, Automated 0.63 0.00 - 0.70 x10*3/uL     Lymphocytes Absolute 2.46 1.20 - 4.80 x10*3/uL     Monocytes Absolute 1.44 (H) 0.10 - 1.00 x10*3/uL     Eosinophils Absolute 0.23 0.00 - 0.70 x10*3/uL     Basophils Absolute 0.17 (H) 0.00 - 0.10 x10*3/uL   Comprehensive Metabolic Panel   Result Value Ref Range     Glucose 367 (H) 74 - 99 mg/dL     Sodium 130 (L) 136 - 145 mmol/L     Potassium 4.4 3.5 - 5.3 mmol/L     Chloride 95 (L) 98 - 107 mmol/L     Bicarbonate 26 21 - 32 mmol/L     Anion Gap 13 10 - 20 mmol/L     Urea Nitrogen 17 6 - 23 mg/dL     Creatinine 0.83 0.50 - 1.30 mg/dL     eGFR >90 >60 mL/min/1.73m*2     Calcium 9.0 8.6 - 10.3 mg/dL     Albumin 3.9 3.4 - 5.0 g/dL     Alkaline Phosphatase 305 (H) 33 - 136 U/L     Total Protein 7.4 6.4 - 8.2 g/dL     AST 26 9 - 39 U/L     Bilirubin, Total 0.7 0.0 - 1.2 mg/dL     ALT 67 (H) 10 - 52 U/L   Lactate   Result Value Ref Range     Lactate 1.7 0.4 - 2.0 mmol/L   Blood Culture     Specimen: Peripheral Venipuncture; Blood culture   Result Value Ref Range     Blood Culture Loaded on Instrument - Culture in progress     Blood Culture     Specimen: Peripheral Venipuncture; Blood culture   Result Value Ref Range     Blood Culture Loaded on Instrument - Culture in progress     Coagulation Screen   Result Value Ref Range     Protime 15.9 (H) 9.8 - 12.8 seconds     INR 1.4 (H) 0.9 - 1.1     aPTT 32 27 - 38 seconds   Urinalysis with Reflex Microscopic and Culture   Result Value Ref Range     Color, Urine Yellow Straw, Yellow     Appearance, Urine Clear Clear     Specific Gravity, Urine 1.040 (N) 1.005 - 1.035     pH, Urine 6.0 5.0, 5.5, 6.0, 6.5, 7.0, 7.5, 8.0     Protein, Urine NEGATIVE  NEGATIVE mg/dL     Glucose, Urine >=500 (3+) (A) NEGATIVE mg/dL     Blood, Urine NEGATIVE NEGATIVE     Ketones, Urine NEGATIVE NEGATIVE mg/dL     Bilirubin, Urine NEGATIVE NEGATIVE     Urobilinogen, Urine <2.0 <2.0 mg/dL     Nitrite, Urine NEGATIVE NEGATIVE     Leukocyte Esterase, Urine NEGATIVE NEGATIVE   Comprehensive metabolic panel   Result Value Ref Range     Glucose 319 (H) 74 - 99 mg/dL     Sodium 132 (L) 136 - 145 mmol/L     Potassium 4.2 3.5 - 5.3 mmol/L     Chloride 96 (L) 98 - 107 mmol/L     Bicarbonate 27 21 - 32 mmol/L     Anion Gap 13 10 - 20 mmol/L     Urea Nitrogen 16 6 - 23 mg/dL     Creatinine 0.83 0.50 - 1.30 mg/dL     eGFR >90 >60 mL/min/1.73m*2     Calcium 8.5 (L) 8.6 - 10.3 mg/dL     Albumin 3.2 (L) 3.4 - 5.0 g/dL     Alkaline Phosphatase 255 (H) 33 - 136 U/L     Total Protein 6.3 (L) 6.4 - 8.2 g/dL     AST 20 9 - 39 U/L     Bilirubin, Total 0.8 0.0 - 1.2 mg/dL     ALT 53 (H) 10 - 52 U/L   CBC and Auto Differential   Result Value Ref Range     WBC 19.6 (H) 4.4 - 11.3 x10*3/uL     nRBC 0.0 0.0 - 0.0 /100 WBCs     RBC 3.68 (L) 4.50 - 5.90 x10*6/uL     Hemoglobin 11.4 (L) 13.5 - 17.5 g/dL     Hematocrit 33.7 (L) 41.0 - 52.0 %     MCV 92 80 - 100 fL     MCH 31.0 26.0 - 34.0 pg     MCHC 33.8 32.0 - 36.0 g/dL     RDW 12.4 11.5 - 14.5 %     Platelets 345 150 - 450 x10*3/uL     MPV 9.4 7.5 - 11.5 fL     Neutrophils % 73.9 40.0 - 80.0 %     Immature Granulocytes %, Automated 2.4 (H) 0.0 - 0.9 %     Lymphocytes % 14.0 13.0 - 44.0 %     Monocytes % 7.6 2.0 - 10.0 %     Eosinophils % 1.3 0.0 - 6.0 %     Basophils % 0.8 0.0 - 2.0 %     Neutrophils Absolute 14.50 (H) 1.20 - 7.70 x10*3/uL     Immature Granulocytes Absolute, Automated 0.47 0.00 - 0.70 x10*3/uL     Lymphocytes Absolute 2.75 1.20 - 4.80 x10*3/uL     Monocytes Absolute 1.49 (H) 0.10 - 1.00 x10*3/uL     Eosinophils Absolute 0.25 0.00 - 0.70 x10*3/uL     Basophils Absolute 0.16 (H) 0.00 - 0.10 x10*3/uL   POCT GLUCOSE   Result Value Ref Range      "POCT Glucose 333 (H) 74 - 99 mg/dL   POCT GLUCOSE   Result Value Ref Range     POCT Glucose 292 (H) 74 - 99 mg/dL            Lab Results   Component Value Date     HGBA1C 10.0 (A) 08/08/2023      No results found for: \"CRP\"   No results found for: \"SEDRATE\"           Results from last 7 days   Lab Units 10/17/23  0442   WBC AUTO x10*3/uL 19.6*   RBC AUTO x10*6/uL 3.68*   HEMOGLOBIN g/dL 11.4*   HEMATOCRIT % 33.7*           Results from last 7 days   Lab Units 10/17/23  0441   SODIUM mmol/L 132*   POTASSIUM mmol/L 4.2   CHLORIDE mmol/L 96*   CO2 mmol/L 27   BUN mg/dL 16   CREATININE mg/dL 0.83   CALCIUM mg/dL 8.5*   BILIRUBIN TOTAL mg/dL 0.8   ALT U/L 53*   AST U/L 20           Results from last 7 days   Lab Units 10/17/23  0106   COLOR U   Yellow   APPEARANCE U   Clear   PH U   6.0   SPEC GRAV UR   1.040*   PROTEIN U mg/dL NEGATIVE   BLOOD UR   NEGATIVE   NITRITE U   NEGATIVE         IMAGING REVIEW:  CT femur left w IV contrast     Result Date: 10/17/2023  Interpreted By:  Lamin Hampton, STUDY: CT TIBIA FIBULA LEFT W IV CONTRAST; CT FEMUR LEFT W IV CONTRAST; 10/17/2023 12:09 am   INDICATION: Signs/Symptoms:LLE pretibial abscess; Signs/Symptoms:LLE Pretibial abscess.  Please obatin CT LLE w/ IV contrast.  No need for imaging of RLE..   COMPARISON: None.   ACCESSION NUMBER(S): [id number]; [id number]   ORDERING CLINICIAN: GUNJAN STACK   TECHNIQUE: CT imaging of the left femur and tibia/fibula was obtained after the intravenous administration of 75 mL Omnipaque 350 contrast. Coronal and sagittal reformatted images were performed. 3D reconstructed images were not performed at time of dictation therefore not used during interpretation.   FINDINGS: OSSEOUS STRUCTURES:   There is no acute fracture of the left femur or left tibia/fibula. There is normal alignment of the left hip, knee, and ankle. There is mild left hip osteoarthrosis without joint effusion. There is mild medial and patellofemoral compartment " osteoarthrosis of the left knee without significant joint effusion. There is no significant degenerative change or effusion of the left tibiotalar joint. Enthesopathic spurring at the Achilles tendon insertion.  There is no osseous destruction or erosive change throughout the left lower extremity.     SOFT TISSUES:   There is a 9.5 cm x 5.7 cm x 1.3 cm rim enhancing fluid collection consistent with abscess. This extends from the mid patellar tendon to the proximal tibial diaphysis (sagittal image 77/149). It abuts the fascia of the anterior compartment musculature (axial image 299/497, series 606), abuts the tibial tuberosity, and drains to the skin surface. There is diffuse thickening of the skin in the mid to proximal left leg. There is diffuse subcutaneous edema involving the entire left leg that is most severe at the level of the abscess. There is also subcutaneous edema of the left thigh that is circumferential distally and located along the medial and anteromedial aspects slightly more proximally. There is no evidence of deep compartment infection. There is no soft tissue gas except for few foci of gas within the abscess.   There are reactive enlarged left inguinal lymph nodes measuring up to 1.4 cm.     OTHER:   The prostate gland is enlarged, measuring 5.1 cm in transverse dimension. There is diverticulosis of the partially visualized sigmoid colon. Incidental note of small bilateral hydroceles.        1. 9.5 cm x 5.7 cm x 1.3 cm abscess in the upper pretibial soft tissues extending from the mid patellar tendon to the upper tibial diaphysis, abuts the tibial tuberosity and fascia of the anterior compartment musculature in contains a tiny amount of internal gas locules that may be due to the fact that it drains to the skin surface or attempted drainage. No deep compartment involvement, no evidence of myositis, necrotizing fasciitis, or osteomyelitis.   2. Cellulitis diffusely involving the left leg throughout  its entire length, most severe at the level of the abscess. There is also milder cellulitis of the thyroid is circumferential distally and involves the medial and anteromedial aspects of the mid proximal left thigh.   MACRO: None.   Signed by: Lamin Hampton 10/17/2023 1:03 AM Dictation workstation:   [id number]     CT tibia fibula left w IV contrast     Result Date: 10/17/2023  Interpreted By:  Lamin Hampton, STUDY: CT TIBIA FIBULA LEFT W IV CONTRAST; CT FEMUR LEFT W IV CONTRAST; 10/17/2023 12:09 am   INDICATION: Signs/Symptoms:LLE pretibial abscess; Signs/Symptoms:LLE Pretibial abscess.  Please obatin CT LLE w/ IV contrast.  No need for imaging of RLE..   COMPARISON: None.   ACCESSION NUMBER(S): [id number]; [id number]   ORDERING CLINICIAN: GUNJAN STACK   TECHNIQUE: CT imaging of the left femur and tibia/fibula was obtained after the intravenous administration of 75 mL Omnipaque 350 contrast. Coronal and sagittal reformatted images were performed. 3D reconstructed images were not performed at time of dictation therefore not used during interpretation.   FINDINGS: OSSEOUS STRUCTURES:   There is no acute fracture of the left femur or left tibia/fibula. There is normal alignment of the left hip, knee, and ankle. There is mild left hip osteoarthrosis without joint effusion. There is mild medial and patellofemoral compartment osteoarthrosis of the left knee without significant joint effusion. There is no significant degenerative change or effusion of the left tibiotalar joint. Enthesopathic spurring at the Achilles tendon insertion.  There is no osseous destruction or erosive change throughout the left lower extremity.     SOFT TISSUES:   There is a 9.5 cm x 5.7 cm x 1.3 cm rim enhancing fluid collection consistent with abscess. This extends from the mid patellar tendon to the proximal tibial diaphysis (sagittal image 77/149). It abuts the fascia of the anterior compartment musculature (axial image 299/497,  series 606), abuts the tibial tuberosity, and drains to the skin surface. There is diffuse thickening of the skin in the mid to proximal left leg. There is diffuse subcutaneous edema involving the entire left leg that is most severe at the level of the abscess. There is also subcutaneous edema of the left thigh that is circumferential distally and located along the medial and anteromedial aspects slightly more proximally. There is no evidence of deep compartment infection. There is no soft tissue gas except for few foci of gas within the abscess.   There are reactive enlarged left inguinal lymph nodes measuring up to 1.4 cm.     OTHER:   The prostate gland is enlarged, measuring 5.1 cm in transverse dimension. There is diverticulosis of the partially visualized sigmoid colon. Incidental note of small bilateral hydroceles.        1. 9.5 cm x 5.7 cm x 1.3 cm abscess in the upper pretibial soft tissues extending from the mid patellar tendon to the upper tibial diaphysis, abuts the tibial tuberosity and fascia of the anterior compartment musculature in contains a tiny amount of internal gas locules that may be due to the fact that it drains to the skin surface or attempted drainage. No deep compartment involvement, no evidence of myositis, necrotizing fasciitis, or osteomyelitis.   2. Cellulitis diffusely involving the left leg throughout its entire length, most severe at the level of the abscess. There is also milder cellulitis of the thyroid is circumferential distally and involves the medial and anteromedial aspects of the mid proximal left thigh.   MACRO: None.   Signed by: aLmin Hampton 10/17/2023 1:03 AM Dictation workstation:   [id number]     Lower extremity venous duplex left     Result Date: 10/16/2023  Interpreted By:  Lamin Hampton, STUDY: [facility] LOWER EXTREMITY VENOUS DUPLEX LEFT;  10/16/2023 6:55 pm   INDICATION: Signs/Symptoms:L leg pain.   COMPARISON: None.   ACCESSION NUMBER(S): [id number]    ORDERING CLINICIAN: JUAN MCNEIL   TECHNIQUE: Grayscale, color and spectral Doppler sonographic images of the left lower extremity deep venous system. The right common femoral vein was imaged for comparison.   FINDINGS: THIGH VEINS: There is normal compressibility of the left common femoral vein, saphenous-femoral junction, femoral vein and popliteal vein. There is normal spontaneous and phasic variation by spectral doppler.   CALF VEINS: The posterior tibial and peroneal veins demonstrate normal color flow and compressibility.   CONTRALATERAL COMPARISON: The right common femoral vein is patent.   OTHER FINDINGS: None.        No evidence of acute DVT in the left lower extremity.   MACRO: None.   Signed by: Lamin Hampton 10/16/2023 6:57 PM Dictation workstation:   [id number]     Urgent Care Xray     Result Date: 10/13/2023  Interpreted By:  Juan Davies, STUDY: XR URGENT CARE XRAY; ;  10/13/2023 6:50 pm   INDICATION: Signs/Symptoms:left lower leg pain.   COMPARISON: None.   ACCESSION NUMBER(S): [id number]   ORDERING CLINICIAN: SHELBY CHRISTENSEN   FINDINGS: No acute fracture. There is cortical thickening seen along the posterior aspect of the left tibia. No evidence of stress fracture or stress reaction in the bone. If persistent concern, MRI can be considered        No correlate seen in the area of clinical concern. If concern for stress fracture or stress reaction in the bone, consider further evaluation with MRI. No acute fracture   Benign-appearing cortical thinning noted along the left posterior tibial     MACRO: None   Signed by: Juan Davies 10/13/2023 7:19 PM Dictation workstation:   [id number]     Colonoscopy     Result Date: 9/25/2023  Patient Name: Shoaib Cheng Procedure Date: 9/25/2023 8:33 AM MRN: [id number] Account Number: [id number] YOB: 1960 Admit Type: Outpatient Site: [facility] Room 2 Ethnicity: Not  or  Race: White Attending MD: Mendel Marquez MD,  [id number] Procedure:             Colonoscopy Indications:           High risk colon cancer surveillance: Personal history                        of colonic polyps Patient Profile:       This is a 63 year old male. Refer to note in patient                        chart for documentation of history and physical. Last                        Colonoscopy: 2013. Providers:             Mendel Marquez MD (Doctor) Gastroenterology                        [place], [place], Jeni Roman RN (Nurse) , Emma Manuel Referring:             Jomar Garcia MD Medicines:             Monitored Anesthesia Care Complications:         No immediate complications. Procedure:             Pre-Anesthesia Assessment:                        - Prior to the procedure, a History and Physical was                        performed, and patient medications and allergies were                        reviewed. The patient's tolerance of previous                        anesthesia was also reviewed. The risks and benefits                        of the procedure and the sedation options and risks                        were discussed with the patient. All questions were                        answered, and informed consent was obtained. Prior                        Anticoagulants: The patient has taken no anticoagulant                        or antiplatelet agents. ASA Grade Assessment: II - A                        patient with mild systemic disease. After reviewing                        the risks and benefits, the patient was deemed in                        satisfactory condition to undergo the procedure.                        After I obtained informed consent, the scope was                        passed under direct vision. Throughout the procedure,                        the patient's blood pressure, pulse, and oxygen                        saturations were monitored continuously. The adult                         colonoscope was introduced through the anus and                        advanced to the terminal ileum. The colonoscopy was                        performed without difficulty. The patient tolerated                        the procedure well. The quality of the bowel                        preparation was good. The quality of the bowel                        preparation was evaluated using the BBPS ([place] Bowel                        Preparation Scale) with scores of: Right Colon = 3                        (entire mucosa seen well with no residual staining,                        small fragments of stool or opaque liquid), Transverse                        Colon = 3 (entire mucosa seen well with no residual                        staining, small fragments of stool or opaque liquid)                        and Left Colon = 2 (minor amount of residual staining,                        small fragments of stool and/or opaque liquid, but                        mucosa seen well). The total BBPS score equals 8. The                        terminal ileum, ileocecal valve, appendiceal orifice,                        and rectum were photographed. Findings:      Three sessile polyps were found in the descending colon and splenic      flexure. The polyps were 3 to 4 mm in size. These polyps were removed      with a cold biopsy forceps. Resection and retrieval were complete.      Multiple medium-mouthed diverticula were found in the sigmoid colon.      Non-bleeding external and internal hemorrhoids were found during      retroflexion. The hemorrhoids were small.      The digital rectal exam was normal. Pertinent negatives include no      palpable rectal lesions. Estimated Blood Loss:      Estimated blood loss was minimal. Impression:            - Three 3 to 4 mm polyps in the descending colon and                        at the splenic flexure, removed with a cold biopsy                        forceps. Resected and retrieved.                         - Diverticulosis in the sigmoid colon.                        - Non-bleeding external and internal hemorrhoids. Recommendation:        - Discharge patient to home.                        - Await pathology results.                        - Repeat colonoscopy in 3 - 5 years for surveillance                        based on pathology results.                        - Patient has a contact number available for                        emergencies. The signs and symptoms of potential                        delayed complications were discussed with the patient.                        Return to normal activities tomorrow. Written                        discharge instructions were provided to the patient.                        - Resume previous diet.                        - Continue present medications.                        - Return to referring physician. Procedure Code(s):     --- Professional ---                        66002, Colonoscopy, flexible; with biopsy, single or                        multiple Diagnosis Code(s):     --- Professional ---                        Z12.11, Encounter for screening for malignant neoplasm                        of colon                        Z86.010, Personal history of colonic polyps                        D12.4, Benign neoplasm of descending colon                        D12.3, Benign neoplasm of transverse colon (hepatic                        flexure or splenic flexure)                        K64.8, Other hemorrhoids                        K57.30, Diverticulosis of large intestine without                        perforation or abscess without bleeding CPT copyright 2021 [employer]. All rights reserved. The codes documented in this report are preliminary and upon  review may be revised to meet current compliance requirements. Attending Participation:      I personally performed the entire procedure. MD Mendel Sylvester MD  9/25/2023 9:39:09 AM This report has been signed electronically. Number of Addenda: 0 Note Initiated On: 9/25/2023 8:33 AM Scope Withdrawal Time 0 hours 16 minutes 45 seconds Total Procedure Duration Time 0 hours 19 minutes 48 seconds                     Assessment/Plan   ASSESSMENT & PLAN:     #pre tibial abscess, left leg  #cellulitis, left leg  #diabetes mellitus  - Patient was seen and evaluated; all findings were discussed and all questions were answered to patient's satisfaction.  - Charts, labs, vitals and imaging all reviewed.   - Imaging: XR left tib/fib done at urgent care on 10/13/23 demonstrated Benign-appearing cortical thinning noted along the left posterior tibial.   - Imaging CT left tib/fib: 9.5 cm x 5.7 cm x 1.3 cm abscess in the upper pretibial soft  tissues extending from the mid patellar tendon to the upper tibial  diaphysis, abuts the tibial tuberosity and fascia of the anterior  compartment musculature in contains a tiny amount of internal gas  locules that may be due to the fact that it drains to the skin  surface or attempted drainage. No deep compartment involvement, no  evidence of myositis, necrotizing fasciitis, or osteomyelitis.  - Labs: WBC 19.6, lactate 1.7  - Wound culture: collected bedside, pending  - Blood culture: pending, NGTD     Plan:  - Abx: vanco+zosyn, failure of out pt keflex   - Pain Regimen: per primary  - Patient reports bedside I&D done bedside 10/16/23 with purulence expressed. Upon consult exam today more purulence and large pre tibial abscess visualized. Patient necessitating more thorough source control.   - Due the extension of the abscess proximal from the tibial tuberosity, recommend consulting general surgery for management of the abscess  - Dressings: betadine paint, DSD  - Nursing staff is able to change/reinforce dressing if & as necessary until next day’s dressing change. Thank you.  - Podiatry will follow peripherally     DVT ppx: per  primary  Weightbearing: WBAT        Case to be discussed with attending, A&P above reflects a tentative plan. Please await for the final signature from the attending physician on service.     Gris Ramos DPM PGY-2  Podiatric Medicine & Surgery  Please [place] message me with any questions or concerns.       A total of 40 minutes was spent in formulation of this note, review of charts, labs,  imaging with a minimum of 50% of the time spent in face to face with with the patient.  All questions and concerns were answered to the patients satisfaction.              SIGNATURE: Gris Ramos DPM PATIENT NAME: Shoaib Cheng   DATE: October 17, 2023 MRN: [id number]   TIME: 2:34 PM CONTACT: Haiku message

## 2023-10-18 PROBLEM — L03.116 CELLULITIS AND ABSCESS OF LEFT LEG: Status: ACTIVE | Noted: 2023-10-18

## 2023-10-18 PROBLEM — L02.416 CELLULITIS AND ABSCESS OF LEFT LEG: Status: ACTIVE | Noted: 2023-10-18

## 2023-10-18 LAB
ANION GAP SERPL CALC-SCNC: 12 MMOL/L (ref 10–20)
BUN SERPL-MCNC: 15 MG/DL (ref 6–23)
CALCIUM SERPL-MCNC: 9 MG/DL (ref 8.6–10.3)
CHLORIDE SERPL-SCNC: 99 MMOL/L (ref 98–107)
CO2 SERPL-SCNC: 29 MMOL/L (ref 21–32)
CREAT SERPL-MCNC: 0.8 MG/DL (ref 0.5–1.3)
ERYTHROCYTE [DISTWIDTH] IN BLOOD BY AUTOMATED COUNT: 12.8 % (ref 11.5–14.5)
GFR SERPL CREATININE-BSD FRML MDRD: >90 ML/MIN/1.73M*2
GLUCOSE BLD MANUAL STRIP-MCNC: 239 MG/DL (ref 74–99)
GLUCOSE BLD MANUAL STRIP-MCNC: 248 MG/DL (ref 74–99)
GLUCOSE BLD MANUAL STRIP-MCNC: 257 MG/DL (ref 74–99)
GLUCOSE BLD MANUAL STRIP-MCNC: 320 MG/DL (ref 74–99)
GLUCOSE SERPL-MCNC: 306 MG/DL (ref 74–99)
HCT VFR BLD AUTO: 35.4 % (ref 41–52)
HGB BLD-MCNC: 11.8 G/DL (ref 13.5–17.5)
HOLD SPECIMEN: NORMAL
MCH RBC QN AUTO: 31.1 PG (ref 26–34)
MCHC RBC AUTO-ENTMCNC: 33.3 G/DL (ref 32–36)
MCV RBC AUTO: 93 FL (ref 80–100)
NRBC BLD-RTO: 0 /100 WBCS (ref 0–0)
PLATELET # BLD AUTO: 394 X10*3/UL (ref 150–450)
PMV BLD AUTO: 9.5 FL (ref 7.5–11.5)
POTASSIUM SERPL-SCNC: 5.1 MMOL/L (ref 3.5–5.3)
RBC # BLD AUTO: 3.79 X10*6/UL (ref 4.5–5.9)
SODIUM SERPL-SCNC: 135 MMOL/L (ref 136–145)
VANCOMYCIN SERPL-MCNC: 15.3 UG/ML (ref 5–20)
WBC # BLD AUTO: 11.7 X10*3/UL (ref 4.4–11.3)

## 2023-10-18 PROCEDURE — 96372 THER/PROPH/DIAG INJ SC/IM: CPT | Performed by: PHYSICIAN ASSISTANT

## 2023-10-18 PROCEDURE — 82947 ASSAY GLUCOSE BLOOD QUANT: CPT

## 2023-10-18 PROCEDURE — 2500000004 HC RX 250 GENERAL PHARMACY W/ HCPCS (ALT 636 FOR OP/ED): Performed by: NURSE PRACTITIONER

## 2023-10-18 PROCEDURE — 2500000004 HC RX 250 GENERAL PHARMACY W/ HCPCS (ALT 636 FOR OP/ED): Performed by: PHYSICIAN ASSISTANT

## 2023-10-18 PROCEDURE — 80202 ASSAY OF VANCOMYCIN: CPT | Performed by: INTERNAL MEDICINE

## 2023-10-18 PROCEDURE — 87075 CULTR BACTERIA EXCEPT BLOOD: CPT | Mod: AHULAB,CMCLAB | Performed by: PODIATRIST

## 2023-10-18 PROCEDURE — 2500000005 HC RX 250 GENERAL PHARMACY W/O HCPCS: Performed by: PODIATRIST

## 2023-10-18 PROCEDURE — 80048 BASIC METABOLIC PNL TOTAL CA: CPT | Performed by: PHYSICIAN ASSISTANT

## 2023-10-18 PROCEDURE — 96372 THER/PROPH/DIAG INJ SC/IM: CPT | Performed by: PODIATRIST

## 2023-10-18 PROCEDURE — 2500000002 HC RX 250 W HCPCS SELF ADMINISTERED DRUGS (ALT 637 FOR MEDICARE OP, ALT 636 FOR OP/ED): Performed by: PHYSICIAN ASSISTANT

## 2023-10-18 PROCEDURE — 1100000001 HC PRIVATE ROOM DAILY

## 2023-10-18 PROCEDURE — 99232 SBSQ HOSP IP/OBS MODERATE 35: CPT | Performed by: NURSE PRACTITIONER

## 2023-10-18 PROCEDURE — 36415 COLL VENOUS BLD VENIPUNCTURE: CPT | Performed by: PHYSICIAN ASSISTANT

## 2023-10-18 PROCEDURE — A4217 STERILE WATER/SALINE, 500 ML: HCPCS | Performed by: PHYSICIAN ASSISTANT

## 2023-10-18 PROCEDURE — 2500000001 HC RX 250 WO HCPCS SELF ADMINISTERED DRUGS (ALT 637 FOR MEDICARE OP): Performed by: PHYSICIAN ASSISTANT

## 2023-10-18 PROCEDURE — 2500000004 HC RX 250 GENERAL PHARMACY W/ HCPCS (ALT 636 FOR OP/ED): Performed by: INTERNAL MEDICINE

## 2023-10-18 PROCEDURE — 85027 COMPLETE CBC AUTOMATED: CPT | Performed by: NURSE PRACTITIONER

## 2023-10-18 PROCEDURE — 10060 I&D ABSCESS SIMPLE/SINGLE: CPT | Performed by: PODIATRIST

## 2023-10-18 PROCEDURE — 99232 SBSQ HOSP IP/OBS MODERATE 35: CPT

## 2023-10-18 RX ORDER — LIDOCAINE HYDROCHLORIDE 20 MG/ML
20 INJECTION, SOLUTION INFILTRATION; PERINEURAL ONCE
Status: COMPLETED | OUTPATIENT
Start: 2023-10-18 | End: 2023-10-18

## 2023-10-18 RX ORDER — HYDROMORPHONE HYDROCHLORIDE 1 MG/ML
1 INJECTION, SOLUTION INTRAMUSCULAR; INTRAVENOUS; SUBCUTANEOUS ONCE
Status: COMPLETED | OUTPATIENT
Start: 2023-10-18 | End: 2023-10-18

## 2023-10-18 RX ORDER — METFORMIN HYDROCHLORIDE 750 MG/1
750 TABLET, EXTENDED RELEASE ORAL
Status: DISCONTINUED | OUTPATIENT
Start: 2023-10-18 | End: 2023-10-19 | Stop reason: HOSPADM

## 2023-10-18 RX ADMIN — INSULIN LISPRO 4 UNITS: 100 INJECTION, SOLUTION INTRAVENOUS; SUBCUTANEOUS at 12:19

## 2023-10-18 RX ADMIN — HYDROMORPHONE HYDROCHLORIDE 1 MG: 1 INJECTION, SOLUTION INTRAMUSCULAR; INTRAVENOUS; SUBCUTANEOUS at 14:56

## 2023-10-18 RX ADMIN — DOCUSATE SODIUM 100 MG: 100 CAPSULE, LIQUID FILLED ORAL at 09:00

## 2023-10-18 RX ADMIN — ACETAMINOPHEN 975 MG: 325 TABLET ORAL at 23:28

## 2023-10-18 RX ADMIN — PIPERACILLIN SODIUM AND TAZOBACTAM SODIUM 3.38 G: 3; .375 INJECTION, SOLUTION INTRAVENOUS at 06:15

## 2023-10-18 RX ADMIN — ENOXAPARIN SODIUM 40 MG: 100 INJECTION SUBCUTANEOUS at 08:57

## 2023-10-18 RX ADMIN — PIPERACILLIN SODIUM AND TAZOBACTAM SODIUM 3.38 G: 3; .375 INJECTION, SOLUTION INTRAVENOUS at 16:30

## 2023-10-18 RX ADMIN — INSULIN LISPRO 6 UNITS: 100 INJECTION, SOLUTION INTRAVENOUS; SUBCUTANEOUS at 16:29

## 2023-10-18 RX ADMIN — INSULIN LISPRO 8 UNITS: 100 INJECTION, SOLUTION INTRAVENOUS; SUBCUTANEOUS at 08:57

## 2023-10-18 RX ADMIN — INSULIN LISPRO 4 UNITS: 100 INJECTION, SOLUTION INTRAVENOUS; SUBCUTANEOUS at 21:37

## 2023-10-18 RX ADMIN — PIPERACILLIN SODIUM AND TAZOBACTAM SODIUM 3.38 G: 3; .375 INJECTION, SOLUTION INTRAVENOUS at 23:26

## 2023-10-18 RX ADMIN — TAMSULOSIN HYDROCHLORIDE 0.4 MG: 0.4 CAPSULE ORAL at 08:55

## 2023-10-18 RX ADMIN — ACETAMINOPHEN 975 MG: 325 TABLET ORAL at 09:10

## 2023-10-18 RX ADMIN — VANCOMYCIN HYDROCHLORIDE 1250 MG: 10 INJECTION, POWDER, LYOPHILIZED, FOR SOLUTION INTRAVENOUS at 02:42

## 2023-10-18 RX ADMIN — PIPERACILLIN SODIUM AND TAZOBACTAM SODIUM 3.38 G: 3; .375 INJECTION, SOLUTION INTRAVENOUS at 11:03

## 2023-10-18 RX ADMIN — PANTOPRAZOLE SODIUM 40 MG: 40 TABLET, DELAYED RELEASE ORAL at 06:15

## 2023-10-18 RX ADMIN — LIDOCAINE HYDROCHLORIDE 400 MG: 20 INJECTION, SOLUTION INFILTRATION; PERINEURAL at 15:12

## 2023-10-18 RX ADMIN — VANCOMYCIN HYDROCHLORIDE 1250 MG: 10 INJECTION, POWDER, LYOPHILIZED, FOR SOLUTION INTRAVENOUS at 14:44

## 2023-10-18 ASSESSMENT — COGNITIVE AND FUNCTIONAL STATUS - GENERAL
DAILY ACTIVITIY SCORE: 24
MOBILITY SCORE: 24

## 2023-10-18 ASSESSMENT — PAIN SCALES - GENERAL
PAINLEVEL_OUTOF10: 4
PAINLEVEL_OUTOF10: 4

## 2023-10-18 NOTE — PROGRESS NOTES
Shoaib Cheng is a 63 y.o. male who presented to ed on 10/16/2023 presenting with Abscess of left leg.    Subjective    Resting in bed.  No apparent distress.  Reports no further edema.  Leg pain has lessened since starting IV antibiotics.    Review of systems   Negative except as noted above        Objective   Physical Exam   GENERAL: Alert, NAD, cooperative  SKIN: Bandaged wound, deferred direct observation of wound. Warm and dry.    HEENT:  NCAT, PERRLA, EOMI, nonicteric sclera, MMM, neck supple, trachea midline  LUNGS: Unlabored, CTAB, no significant wheezing, rhonchi, or rales appreciated  CARDS: RRR, no m/g/r appreciated  GI: Soft, NTND, BS+, no rebound, no guarding   : no baker, voids independently   MS/Extremities: Bandaged wound below L knee with much less erythema, edema and pain. all erythema localized to below knee.  WWP, distal pulses intact, moves all extremities.  NEURO: A&Ox3, grossly nonfocal exam, speech fluent, follows commands, answers questions appropriately  PSYCH: mood and behavior appropriate    Last Recorded Vitals  /79 (BP Location: Right arm, Patient Position: Sitting)   Pulse 75   Temp 36.6 °C (97.9 °F) (Temporal)   Resp 16   Wt 103 kg (226 lb)   SpO2 99%   Intake/Output last 3 Shifts:  No intake or output data in the 24 hours ending 10/18/23 1343    Admission Weight  Weight: 103 kg (226 lb) (10/16/23 1735)  Daily Weight  10/17/23 : 103 kg (226 lb)      Medications   Scheduled medications  docusate sodium, 100 mg, oral, BID  enoxaparin, 40 mg, subcutaneous, q24h  insulin lispro, 0-10 Units, subcutaneous, Before meals & nightly  melatonin, 3 mg, oral, Daily  metFORMIN XR, 750 mg, oral, Daily with evening meal  pantoprazole, 40 mg, oral, Daily before breakfast   Or  pantoprazole, 40 mg, intravenous, Daily before breakfast  piperacillin-tazobactam, 3.375 g, intravenous, q6h  tamsulosin, 0.4 mg, oral, Daily before breakfast  vancomycin, 1,250 mg, intravenous, q12h      Continuous  medications     PRN medications  PRN medications: acetaminophen, dextrose 10 % in water (D10W), dextrose, glucagon, morphine, oxyCODONE     Relevant Results  Image Results  CT femur left w IV contrast, CT tibia fibula left w IV contrast  Narrative: Interpreted By:  Lamin Hampton,   STUDY:  CT TIBIA FIBULA LEFT W IV CONTRAST; CT FEMUR LEFT W IV CONTRAST;  10/17/2023 12:09 am      INDICATION:  Signs/Symptoms:LLE pretibial abscess; Signs/Symptoms:LLE Pretibial  abscess.  Please obatin CT LLE w/ IV contrast.  No need for imaging  of RLE..      COMPARISON:  None.      ACCESSION NUMBER(S):  [id number]; [id number]      ORDERING CLINICIAN:  GUNJAN STACK      TECHNIQUE:  CT imaging of the left femur and tibia/fibula was obtained after the  intravenous administration of 75 mL Omnipaque 350 contrast. Coronal  and sagittal reformatted images were performed. 3D reconstructed  images were not performed at time of dictation therefore not used  during interpretation.      FINDINGS:  OSSEOUS STRUCTURES:      There is no acute fracture of the left femur or left tibia/fibula.  There is normal alignment of the left hip, knee, and ankle. There is  mild left hip osteoarthrosis without joint effusion. There is mild  medial and patellofemoral compartment osteoarthrosis of the left knee  without significant joint effusion. There is no significant  degenerative change or effusion of the left tibiotalar joint.  Enthesopathic spurring at the Achilles tendon insertion.  There is no  osseous destruction or erosive change throughout the left lower  extremity.          SOFT TISSUES:      There is a 9.5 cm x 5.7 cm x 1.3 cm rim enhancing fluid collection  consistent with abscess. This extends from the mid patellar tendon to  the proximal tibial diaphysis (sagittal image 77/149). It abuts the  fascia of the anterior compartment musculature (axial image 299/497,  series 606), abuts the tibial tuberosity, and drains to the skin  surface. There  is diffuse thickening of the skin in the mid to  proximal left leg. There is diffuse subcutaneous edema involving the  entire left leg that is most severe at the level of the abscess.  There is also subcutaneous edema of the left thigh that is  circumferential distally and located along the medial and  anteromedial aspects slightly more proximally. There is no evidence  of deep compartment infection. There is no soft tissue gas except for  few foci of gas within the abscess.      There are reactive enlarged left inguinal lymph nodes measuring up to  1.4 cm.          OTHER:      The prostate gland is enlarged, measuring 5.1 cm in transverse  dimension. There is diverticulosis of the partially visualized  sigmoid colon. Incidental note of small bilateral hydroceles.      Impression: 1. 9.5 cm x 5.7 cm x 1.3 cm abscess in the upper pretibial soft  tissues extending from the mid patellar tendon to the upper tibial  diaphysis, abuts the tibial tuberosity and fascia of the anterior  compartment musculature in contains a tiny amount of internal gas  locules that may be due to the fact that it drains to the skin  surface or attempted drainage. No deep compartment involvement, no  evidence of myositis, necrotizing fasciitis, or osteomyelitis.      2. Cellulitis diffusely involving the left leg throughout its entire  length, most severe at the level of the abscess. There is also milder  cellulitis of the thyroid is circumferential distally and involves  the medial and anteromedial aspects of the mid proximal left thigh.      MACRO:  None.      Signed by: Lamin Hampton 10/17/2023 1:03 AM  Dictation workstation:   [id number]        Assessment/Plan   Shoaib Cheng is a 63 y.o. male presenting with wound to LLE wound x 9 days. Wound sustained 10/7 in [place] after slipping on pool stair and scraping L leg. Salt water [facility] pool. Denies injury elsewhere. Rested, iced, elevated leg following week with some relief but still  noted increasing swelling, redness, pain. Friday 10/14 went to urgent care due to severe pain. Prescribed keflex, took 2-3 days, subsequently resented to ED 10/16 due to worsening pain, redness, swelling despite antibiotic. I&Ded in ED. Did note at some point during week, wound opened and drained clear, yellow fluid. Ambulating ok but painful. Denies f/c, CP, SOB, dyspnea, n/v, changes in stool or urine.      Principal Problem:    Abscess of left leg  Active Problems:    Cellulitis and abscess of left leg  cellulitis  DMII  BPH  Sepsis watch   -CBC: leukocytosis (21.5 -> 19.6-> 14.1) mild anemia, no thrombocytopenia  -CMP: Hyponatremia (130) in setting of hyperglycemia (367) ---> corrected sodium = 136, no acute renal or liver dysfunction appreciated   -Lactate: 1.7  -Blood cultures x2, tissue culture in progress  -UA: unremarkable for infection  -CT L femur, tibia: 9.5 cm x 5.7 cm x 1.3 cm abscess in the upper pretibial soft  Tissues.  No deep compartment involvement, no evidence of myositis, necrotizing fasciitis, or osteomyelitis. Cellulitis diffusely involving the left leg throughout its entire length, most severe at the level of the abscess  -LE US venous duplex: unremarkable   -Zosyn, vancomycin ---> de escalation pending culture results  -ISS 2 ACHS  -NPO pending possible surgical washout - changed to carb controlled diet. Pt refused as he is still unsure if surgery is taking him to or. Surgery not rounded yet today   -resume home meds as appropriate   -Podiatry consult and recommendations appreciated -- rec stat gen surg consult.  - wound culture sent     Dvt prophylaxis   - lovenox  - scd/ambulate     Gi prophylaxis   - pantopraxole   - miralax     DC plan  - DC home when medically stable    Labs/Testing reviewed:   Interdisciplinary team rounding completed with hospitalist, nurse, TCC  NP discussed plan & lab/testing results with Dr. abraham  --- pending surgery recs today. Handoff to hospitalist sent in  secure messaging.   45 min spent on professional & overall care of this patient    Suzy Leonardo, APRN-CNP

## 2023-10-18 NOTE — PROGRESS NOTES
"Shoaib Cheng is a 63 y.o. male on day 0 of admission presenting with Abscess of left leg.    Subjective   Late entry: CLAUDIO. Patient still in pain, improving slightly. Incision continues draining purulent fluid. Podiatry following.    Objective     Physical Exam  Constitutional:       Appearance: Normal appearance.   Eyes:      Extraocular Movements: Extraocular movements intact.      Pupils: Pupils are equal, round, and reactive to light.   Cardiovascular:      Rate and Rhythm: Normal rate and regular rhythm.      Pulses: Normal pulses.      Heart sounds: Normal heart sounds.   Pulmonary:      Effort: Pulmonary effort is normal. No respiratory distress.      Breath sounds: Normal breath sounds.   Abdominal:      General: Abdomen is flat. Bowel sounds are normal. There is no distension.      Palpations: Abdomen is soft.   Musculoskeletal:         General: Swelling and tenderness present. Normal range of motion.      Comments: Left leg: Incision draining purulent fluid. Erythema extending to proximal-middle shin   Skin:     General: Skin is warm and dry.      Capillary Refill: Capillary refill takes less than 2 seconds.   Neurological:      General: No focal deficit present.      Mental Status: He is alert and oriented to person, place, and time.   Psychiatric:         Mood and Affect: Mood normal.         Behavior: Behavior normal.         Thought Content: Thought content normal.         Judgment: Judgment normal.         Last Recorded Vitals  Blood pressure 127/82, pulse 69, temperature 36.7 °C (98.1 °F), temperature source Temporal, resp. rate 16, height 1.803 m (5' 11\"), weight 103 kg (226 lb), SpO2 99 %.  Intake/Output last 3 Shifts:  I/O last 3 completed shifts:  In: - (0 mL/kg)   Out: 600 (5.9 mL/kg) [Urine:600 (0.2 mL/kg/hr)]  Weight: 102.5 kg     Relevant Results  .Scheduled medications  docusate sodium, 100 mg, oral, BID  enoxaparin, 40 mg, subcutaneous, q24h  insulin lispro, 0-10 Units, subcutaneous, Before " meals & nightly  melatonin, 3 mg, oral, Daily  metFORMIN XR, 750 mg, oral, Daily with evening meal  pantoprazole, 40 mg, oral, Daily before breakfast   Or  pantoprazole, 40 mg, intravenous, Daily before breakfast  piperacillin-tazobactam, 3.375 g, intravenous, q6h  tamsulosin, 0.4 mg, oral, Daily before breakfast  vancomycin, 1,250 mg, intravenous, q12h      Continuous medications     PRN medications  PRN medications: acetaminophen, dextrose 10 % in water (D10W), dextrose, glucagon, morphine, oxyCODONE    .  Results for orders placed or performed during the hospital encounter of 10/16/23 (from the past 24 hour(s))   POCT GLUCOSE   Result Value Ref Range    POCT Glucose 259 (H) 74 - 99 mg/dL   Basic Metabolic Panel   Result Value Ref Range    Glucose 306 (H) 74 - 99 mg/dL    Sodium 135 (L) 136 - 145 mmol/L    Potassium 5.1 3.5 - 5.3 mmol/L    Chloride 99 98 - 107 mmol/L    Bicarbonate 29 21 - 32 mmol/L    Anion Gap 12 10 - 20 mmol/L    Urea Nitrogen 15 6 - 23 mg/dL    Creatinine 0.80 0.50 - 1.30 mg/dL    eGFR >90 >60 mL/min/1.73m*2    Calcium 9.0 8.6 - 10.3 mg/dL   Lavender Top   Result Value Ref Range    Extra Tube Hold for add-ons.    Vancomycin   Result Value Ref Range    Vancomycin 15.3 5.0 - 20.0 ug/mL   CBC   Result Value Ref Range    WBC 11.7 (H) 4.4 - 11.3 x10*3/uL    nRBC 0.0 0.0 - 0.0 /100 WBCs    RBC 3.79 (L) 4.50 - 5.90 x10*6/uL    Hemoglobin 11.8 (L) 13.5 - 17.5 g/dL    Hematocrit 35.4 (L) 41.0 - 52.0 %    MCV 93 80 - 100 fL    MCH 31.1 26.0 - 34.0 pg    MCHC 33.3 32.0 - 36.0 g/dL    RDW 12.8 11.5 - 14.5 %    Platelets 394 150 - 450 x10*3/uL    MPV 9.5 7.5 - 11.5 fL   POCT GLUCOSE   Result Value Ref Range    POCT Glucose 320 (H) 74 - 99 mg/dL   POCT GLUCOSE   Result Value Ref Range    POCT Glucose 239 (H) 74 - 99 mg/dL   POCT GLUCOSE   Result Value Ref Range    POCT Glucose 257 (H) 74 - 99 mg/dL       Assessment/Plan   Principal Problem:    Abscess of left leg  Active Problems:    Cellulitis and abscess  of left leg    Purulency continues yet reducing from incision site. Erythema appears to be about the same as yesterday. His WBC is down to 11.7 from 19.6. ID consulted.    Plan:  - Podiatry to manage as outpatient  - Continue IV zosyn, vancomycin  - ID consulted  - Cultures show staph aureus  - Monitor cellulitis progression  - Trend WBC  - Will need to better manage hyperglycemia to facilitate proper healing    Dispo: Discussed care plan with Dr Daniel. Hopeful discharge tomorrow pending ID recs. Close follow-up as outpatient will be scheduled per podiatry. Surgery will follow patient tomorrow.    I spent 35 minutes in the professional and overall care of this patient.    Stu Arce PA-C

## 2023-10-18 NOTE — PROGRESS NOTES
"Vancomycin Dosing by Pharmacy- FOLLOW UP    Shoaib Cheng is a 63 y.o. year old male who Pharmacy has been consulted for vancomycin dosing for cellulitis, skin and soft tissue. Based on the patient's indication and renal status this patient is being dosed based on a goal AUC of 400-600.     Renal function is currently stable.    Current vancomycin dose: 1250 mg given every 12 hours    Estimated vancomycin AUC on current dose: 469 mg/L.hr     Visit Vitals  /73 (BP Location: Right arm, Patient Position: Lying)   Pulse 80   Temp 36.8 °C (98.2 °F) (Temporal)   Resp 17        Lab Results   Component Value Date    CREATININE 0.80 10/18/2023    CREATININE 0.83 10/17/2023    CREATININE 0.83 10/16/2023    CREATININE 1.06 08/08/2023    CREATININE 0.87 07/06/2022        Patient weight is No results found for: \"PTWEIGHT\"    No results found for: \"CULTURE\"     I/O last 3 completed shifts:  In: - (0 mL/kg)   Out: 600 (5.9 mL/kg) [Urine:600 (0.2 mL/kg/hr)]  Weight: 102.5 kg   [unfilled]    No results found for: \"PATIENTTEMP\"     Assessment/Plan    Within goal random/trough level    This dosing regimen is predicted by [employer] to result in the following pharmacokinetic parameters:  Exposure target: AUC24 (range)400-600 mg/L.hr   AUC24,ss: 469 mg/L.hr  Probability of AUC24 > 400: 74 %  Ctrough,ss: 15.7 mg/L  Probability of Ctrough,ss > 20: 23 %  Probability of nephrotoxicity (Lodise BRANDON 2009): 11 %    The next level will be obtained on 10/20 at 0500. May be obtained sooner if clinically indicated.   Will continue to monitor renal function daily while on vancomycin and order serum creatinine at least every 48 hours if not already ordered.  Follow for continued vancomycin needs, clinical response, and signs/symptoms of toxicity.       Mylene Rosenthal, PharmD           "

## 2023-10-18 NOTE — PROGRESS NOTES
Discussed patient during IDR-  patient continues with IV antibiotics for abscess.  Surgery recommends continuing to allow it to drain.  WBC count still a little elevated at 11.7.  Surgery to reassess today.  Podiatry following.

## 2023-10-18 NOTE — PROGRESS NOTES
"Shoaib Cheng is a 63 y.o. male on day 0 of admission presenting with Abscess of left leg.    Subjective   Patient is doing well in general. Pain is much improved. So is nausea. Denies vomiting. Denies chest pain or shortness of breath. He has been converted to a full admission. Currently on vanc/zosyn. Initial wound culture was taken prior to antibiotics during bedside I&D, but cannot track the culture. New one taken today. Prior one still in process. Discussed care with general surgery, primary, and ID. ID not officially on consult, recommend placing them on consult for discharge antibiotics input. No current plans for OR debridement. General surgery is okay with washing out the wound today. No additional complaints.       Objective     Physical Exam  Vascular: DP and PT pulses palpable b/l. Palpable popliteal pulse. Increased warmth to the left anterior shin, but improved. Increased erythema to the L anterior shin, improved. 2+ non-pitting edema to LLE, no edema RLE.      Derm: Open wound, appx 1x0.3x0.4 with distal tracking at 7 o'clock appx 2.5cm. No proximal tracking of wound or purulence. Left anterior left shin draining mild purulent drainage. Minimal palpable fluctuance noted. There is no real appreciable crepitus. No malodor upon presentation or after drainage. Appx 5-7 ml's of purulent drainage occurred. Following flushing of the wound, no additional purulence noted. Only serosanguinous drainage.      Neuro: Gross and epicritic sensation is intact. More sensitive to the specific site of injury. LE reflexes intact and normal.     MSK: Moderate pain to palpation of the left anterior shin. Antalgic gait secondary to pain. Negative Tiny and Abbasi sign. Negative pain to the knee joint upon palpation. Negative pain to the knee joint upon ROM.    Last Recorded Vitals  Blood pressure 112/79, pulse 75, temperature 36.6 °C (97.9 °F), temperature source Temporal, resp. rate 16, height 1.803 m (5' 11\"), weight 103 " kg (226 lb), SpO2 99 %.  Intake/Output last 3 Shifts:  I/O last 3 completed shifts:  In: - (0 mL/kg)   Out: 600 (5.9 mL/kg) [Urine:600 (0.2 mL/kg/hr)]  Weight: 102.5 kg     Relevant Results              Component  Ref Range & Units 06:25 1 d ago 2 d ago 2 mo ago 1 yr ago   WBC  4.4 - 11.3 x10*3/uL 11.7 High  19.6 High  21.5 High  10.9 R 9.4 R   nRBC  0.0 - 0.0 /100 WBCs 0.0 0.0 0.0     RBC  4.50 - 5.90 x10*6/uL 3.79 Low  3.68 Low  4.06 Low  4.67 R 4.49 Low  R   Hemoglobin  13.5 - 17.5 g/dL 11.8 Low  11.4 Low  12.8 Low  14.6 13.8   Hematocrit  41.0 - 52.0 % 35.4 Low  33.7 Low  36.4 Low  43.4 40.6 Low    MCV  80 - 100 fL 93 92 90 93 90   MCH  26.0 - 34.0 pg 31.1 31.0 31.5     MCHC  32.0 - 36.0 g/dL 33.3 33.8 35.2 33.6 34.0   RDW  11.5 - 14.5 % 12.8 12.4 12.2 12.1 11.9   Platelets  150 - 450 x10*3/uL 394 345 395 251 R 248 R   MPV  7.5 - 11.5 fL 9.5 9.4 9.4               Component  Ref Range & Units 06:25 1 d ago 2 d ago 2 mo ago 1 yr ago   Glucose  74 - 99 mg/dL 306 High  319 High  367 High  273 High  233 High    Sodium  136 - 145 mmol/L 135 Low  132 Low  130 Low  137 134 Low    Potassium  3.5 - 5.3 mmol/L 5.1 4.2 4.4 4.2 4.2   Chloride  98 - 107 mmol/L 99 96 Low  95 Low  103 101   Bicarbonate  21 - 32 mmol/L 29 27 26 26 26   Anion Gap  10 - 20 mmol/L 12 13 13 12 11   Urea Nitrogen  6 - 23 mg/dL 15 16 17 22 15   Creatinine  0.50 - 1.30 mg/dL 0.80 0.83 0.83 1.06 0.87   eGFR  >60 mL/min/1.73m*2 >90 >90 CM >90 CM     Comment: Calculations of estimated GFR are performed using the 2021 CKD-EPI Study Refit equation without the race variable for the IDMS-Traceable creatinine methods.  https://jasn.asnjournals.org/content/early/2021/09/22/ASN.4414437531   Calcium  8.6 - 10.3 mg/dL 9.0 8.5 Low  9.0             Tissue/Wound Culture/Smear (3+) Moderate Staphylococcus aureus Abnormal    Identification and susceptibility testing to follow        Resulting Agency: [facility]        0 Result Notes      Component  Ref Range & Units 2 d ago    Protime  9.8 - 12.8 seconds 15.9 High    INR  0.9 - 1.1 1.4 High    aPTT  27 - 38 seconds 32        Component  Ref Range & Units 2 d ago   Lactate  0.4 - 2.0 mmol/L 1.7   Resulting Agency [facility]     Incision and Drainage    Date/Time: 10/18/2023 4:23 PM    Performed by: Bryan Kline DPM  Authorized by: Bryan Kline DPM    Consent:     Consent obtained:  Verbal    Consent given by:  Patient    Risks, benefits, and alternatives were discussed: yes      Risks discussed:  Bleeding, incomplete drainage, pain, damage to other organs and infection    Alternatives discussed:  No treatment, delayed treatment, alternative treatment, observation and referral  Universal protocol:     Procedure explained and questions answered to patient or proxy's satisfaction: yes      Relevant documents present and verified: yes      Test results available : yes      Imaging studies available: yes      Required blood products, implants, devices, and special equipment available: yes      Site/side marked: yes      Immediately prior to procedure, a time out was called: yes    Location:     Type:  Abscess    Location:  Lower extremity    Lower extremity location:  Leg    Leg location:  L lower leg  Pre-procedure details:     Skin preparation:  Povidone-iodine  Sedation:     Sedation type:  None  Anesthesia:     Anesthesia method:  Local infiltration    Local anesthetic:  Lidocaine 2% w/o epi  Procedure type:     Complexity:  Simple  Procedure details:     Ultrasound guidance: no      Needle aspiration: no      Incision types:  Stab incision    Incision depth:  Subcutaneous    Wound management:  Probed and deloculated, irrigated with saline and extensive cleaning    Drainage:  Bloody, purulent and serosanguinous    Drainage amount:  Moderate    Wound treatment:  Wound left open    Packing materials:  1/2 in iodoform gauze  Post-procedure details:     Procedure completion:  Tolerated well, no immediate complications  Comments:      Appx 5-7 ml's  purulent drainage expressed. No remaining purulence after flushing. New culture taken and sent.         Assessment/Plan   Principal Problem:    Abscess of left leg  Active Problems:    Cellulitis and abscess of left leg    Additional I&D performed at bedside today by myself following discussion with general surgery. Significantly less purulence than last I&D. Appx 5-7 ml's. Much improved wound. No residual purulence after drainage, flushing, and packing.  Culture is growing 3+ staph at this time. Awaiting sensitivities. Current antibiotics are appropriate.  Recommend at least one more day of IV antibiotics.  ID consult for discharge antibiotic planning. Discussed with ID.  Dressing changes daily of saline flush, iodoform packing, DSD, light ACE wrap.  Minimize anti-inflammatory agents, if possible, due already elevated INR and PT and his recent hematoma formation.  Will need follow up at my [place] office upon discharge. Discussed with gen surg and they are okay with podiatry treating the wound upon discharge.  Clinical presentation and personal review of the CT images do not show proximal extension of the abscess proximal to the tibial tuberosity. The abscess does not appear to be as large as the CT read either. This was discussed with gen surg who also reviewed the images and is in agreement regarding the size and location of the abscess.  Keep leg elevated at all times when in bed. Minimal walking is fine.  Pain control per primary service.  Please contact me on [facility] at any time.       I spent 60 minutes in the professional and overall care of this patient.      Bryan Kline DPM

## 2023-10-18 NOTE — NURSING NOTE
Pt refusing to eat breakfast and lunch, he thinks he might have surgery today, we have told him he may eat, there is no surgery scheduled at this time. He still wants to wait and see.  Suzy Leonardo NP aware

## 2023-10-19 VITALS
TEMPERATURE: 98.4 F | WEIGHT: 226 LBS | DIASTOLIC BLOOD PRESSURE: 61 MMHG | HEART RATE: 90 BPM | BODY MASS INDEX: 31.64 KG/M2 | HEIGHT: 71 IN | OXYGEN SATURATION: 96 % | SYSTOLIC BLOOD PRESSURE: 116 MMHG | RESPIRATION RATE: 18 BRPM

## 2023-10-19 LAB
ANION GAP SERPL CALC-SCNC: 9 MMOL/L (ref 10–20)
BACTERIA SPEC CULT: ABNORMAL
BUN SERPL-MCNC: 14 MG/DL (ref 6–23)
CALCIUM SERPL-MCNC: 9.2 MG/DL (ref 8.6–10.3)
CHLORIDE SERPL-SCNC: 98 MMOL/L (ref 98–107)
CO2 SERPL-SCNC: 30 MMOL/L (ref 21–32)
CREAT SERPL-MCNC: 0.9 MG/DL (ref 0.5–1.3)
ERYTHROCYTE [DISTWIDTH] IN BLOOD BY AUTOMATED COUNT: 12.4 % (ref 11.5–14.5)
GFR SERPL CREATININE-BSD FRML MDRD: >90 ML/MIN/1.73M*2
GLUCOSE BLD MANUAL STRIP-MCNC: 237 MG/DL (ref 74–99)
GLUCOSE BLD MANUAL STRIP-MCNC: 283 MG/DL (ref 74–99)
GLUCOSE BLD MANUAL STRIP-MCNC: 285 MG/DL (ref 74–99)
GLUCOSE SERPL-MCNC: 260 MG/DL (ref 74–99)
GRAM STN SPEC: ABNORMAL
GRAM STN SPEC: ABNORMAL
HCT VFR BLD AUTO: 35.3 % (ref 41–52)
HGB BLD-MCNC: 11.7 G/DL (ref 13.5–17.5)
MCH RBC QN AUTO: 31.2 PG (ref 26–34)
MCHC RBC AUTO-ENTMCNC: 33.1 G/DL (ref 32–36)
MCV RBC AUTO: 94 FL (ref 80–100)
NRBC BLD-RTO: 0 /100 WBCS (ref 0–0)
PLATELET # BLD AUTO: 379 X10*3/UL (ref 150–450)
PMV BLD AUTO: 9.2 FL (ref 7.5–11.5)
POTASSIUM SERPL-SCNC: 4.1 MMOL/L (ref 3.5–5.3)
RBC # BLD AUTO: 3.75 X10*6/UL (ref 4.5–5.9)
SODIUM SERPL-SCNC: 133 MMOL/L (ref 136–145)
WBC # BLD AUTO: 11 X10*3/UL (ref 4.4–11.3)

## 2023-10-19 PROCEDURE — 99239 HOSP IP/OBS DSCHRG MGMT >30: CPT | Performed by: INTERNAL MEDICINE

## 2023-10-19 PROCEDURE — 36415 COLL VENOUS BLD VENIPUNCTURE: CPT | Performed by: NURSE PRACTITIONER

## 2023-10-19 PROCEDURE — 96372 THER/PROPH/DIAG INJ SC/IM: CPT | Performed by: PHYSICIAN ASSISTANT

## 2023-10-19 PROCEDURE — 2500000004 HC RX 250 GENERAL PHARMACY W/ HCPCS (ALT 636 FOR OP/ED): Performed by: NURSE PRACTITIONER

## 2023-10-19 PROCEDURE — 2500000004 HC RX 250 GENERAL PHARMACY W/ HCPCS (ALT 636 FOR OP/ED): Performed by: PHYSICIAN ASSISTANT

## 2023-10-19 PROCEDURE — 82947 ASSAY GLUCOSE BLOOD QUANT: CPT

## 2023-10-19 PROCEDURE — 80048 BASIC METABOLIC PNL TOTAL CA: CPT | Performed by: INTERNAL MEDICINE

## 2023-10-19 PROCEDURE — 85027 COMPLETE CBC AUTOMATED: CPT | Performed by: NURSE PRACTITIONER

## 2023-10-19 PROCEDURE — 2500000002 HC RX 250 W HCPCS SELF ADMINISTERED DRUGS (ALT 637 FOR MEDICARE OP, ALT 636 FOR OP/ED): Performed by: PHYSICIAN ASSISTANT

## 2023-10-19 PROCEDURE — 36415 COLL VENOUS BLD VENIPUNCTURE: CPT | Performed by: INTERNAL MEDICINE

## 2023-10-19 PROCEDURE — 2500000001 HC RX 250 WO HCPCS SELF ADMINISTERED DRUGS (ALT 637 FOR MEDICARE OP): Performed by: PHYSICIAN ASSISTANT

## 2023-10-19 PROCEDURE — 2500000004 HC RX 250 GENERAL PHARMACY W/ HCPCS (ALT 636 FOR OP/ED): Performed by: INTERNAL MEDICINE

## 2023-10-19 PROCEDURE — A4217 STERILE WATER/SALINE, 500 ML: HCPCS | Performed by: PHYSICIAN ASSISTANT

## 2023-10-19 RX ORDER — PANTOPRAZOLE SODIUM 40 MG/1
40 TABLET, DELAYED RELEASE ORAL
Qty: 30 TABLET | Refills: 0 | Status: SHIPPED | OUTPATIENT
Start: 2023-10-20 | End: 2023-11-10

## 2023-10-19 RX ORDER — GLIMEPIRIDE 2 MG/1
2 TABLET ORAL
Qty: 30 TABLET | Refills: 0 | Status: SHIPPED | OUTPATIENT
Start: 2023-10-20 | End: 2024-01-19 | Stop reason: ALTCHOICE

## 2023-10-19 RX ORDER — OXYCODONE HYDROCHLORIDE 5 MG/1
5 TABLET ORAL EVERY 6 HOURS PRN
Qty: 15 TABLET | Refills: 0 | Status: SHIPPED | OUTPATIENT
Start: 2023-10-19 | End: 2023-11-10

## 2023-10-19 RX ORDER — AMOXICILLIN AND CLAVULANATE POTASSIUM 875; 125 MG/1; MG/1
1 TABLET, FILM COATED ORAL EVERY 12 HOURS
Qty: 28 TABLET | Refills: 0 | Status: SHIPPED | OUTPATIENT
Start: 2023-10-19 | End: 2023-11-02

## 2023-10-19 RX ORDER — GLIMEPIRIDE 2 MG/1
2 TABLET ORAL
Status: DISCONTINUED | OUTPATIENT
Start: 2023-10-20 | End: 2023-10-19 | Stop reason: HOSPADM

## 2023-10-19 RX ORDER — METFORMIN HYDROCHLORIDE 500 MG/1
1000 TABLET ORAL
Qty: 120 TABLET | Refills: 0 | Status: SHIPPED | OUTPATIENT
Start: 2023-10-19 | End: 2023-11-15 | Stop reason: SDUPTHER

## 2023-10-19 RX ORDER — TALC
3 POWDER (GRAM) TOPICAL NIGHTLY
Qty: 30 TABLET | Refills: 0 | Status: SHIPPED | OUTPATIENT
Start: 2023-10-19 | End: 2023-11-10

## 2023-10-19 RX ADMIN — INSULIN LISPRO 6 UNITS: 100 INJECTION, SOLUTION INTRAVENOUS; SUBCUTANEOUS at 08:50

## 2023-10-19 RX ADMIN — INSULIN LISPRO 6 UNITS: 100 INJECTION, SOLUTION INTRAVENOUS; SUBCUTANEOUS at 12:25

## 2023-10-19 RX ADMIN — PIPERACILLIN SODIUM AND TAZOBACTAM SODIUM 3.38 G: 3; .375 INJECTION, SOLUTION INTRAVENOUS at 05:42

## 2023-10-19 RX ADMIN — VANCOMYCIN HYDROCHLORIDE 1250 MG: 10 INJECTION, POWDER, LYOPHILIZED, FOR SOLUTION INTRAVENOUS at 02:10

## 2023-10-19 RX ADMIN — PIPERACILLIN SODIUM AND TAZOBACTAM SODIUM 3.38 G: 3; .375 INJECTION, SOLUTION INTRAVENOUS at 12:27

## 2023-10-19 RX ADMIN — OXYCODONE HYDROCHLORIDE 5 MG: 5 TABLET ORAL at 12:08

## 2023-10-19 RX ADMIN — TAMSULOSIN HYDROCHLORIDE 0.4 MG: 0.4 CAPSULE ORAL at 06:34

## 2023-10-19 RX ADMIN — VANCOMYCIN HYDROCHLORIDE 1250 MG: 10 INJECTION, POWDER, LYOPHILIZED, FOR SOLUTION INTRAVENOUS at 15:11

## 2023-10-19 RX ADMIN — ENOXAPARIN SODIUM 40 MG: 100 INJECTION SUBCUTANEOUS at 08:49

## 2023-10-19 ASSESSMENT — PAIN SCALES - GENERAL
PAINLEVEL_OUTOF10: 7
PAINLEVEL_OUTOF10: 0 - NO PAIN

## 2023-10-19 ASSESSMENT — COGNITIVE AND FUNCTIONAL STATUS - GENERAL
DAILY ACTIVITIY SCORE: 24
MOBILITY SCORE: 24

## 2023-10-19 NOTE — DOCUMENTATION CLARIFICATION NOTE
PATIENT:               CALI PHILLIPS  ACCT #:                  [id number]  MRN:                       [id number]  :                       1960  ADMIT DATE:       10/16/2023 7:42 PM  DISCH DATE:  RESPONDING PROVIDER #:        [id number]          PROVIDER RESPONSE TEXT:    Cellulitis is associated with a diabetic ulceration to the distal aspect of the right third toe    CDI QUERY TEXT:    [facility]_Etiology Linkage      Instruction:    Based on your assessment of the patient and the clinical information, please provide the requested documentation by clicking on the appropriate radio button and enter any additional information if prompted.    Question: Please clarify if a relationship exists between cellulitis and diabetes    When answering this query, please exercise your independent professional judgment. The fact that a question is being asked, does not imply that any particular answer is desired or expected.    The patient's clinical indicators include:  This 63 y o male, who bumped his leg in a swimming pool 9 days prior, presented to the hospital with a worsening left lower leg wound.  Clinical Documentation:  - H&P documents, ?Abscess, cellulitis?.  Clinical Indicators:  - Admitting vital signs: Temperature 98.2, HR 98, RR 18, /89, SpO2 98%  - Labs (10/16 - 10/18):  o WBC: 21.5, 19.6, 11.7  o Glucose: 367, 319, 306  o Lactate: 1.7  - Podiatry consult on 10/17 describes left anterior lower leg as:  o ?Visible abscess present to the anterior leg encompassing and extending past the level of the knee?.  o ?Significant purulent drainage with fibrotic slough.?  o ?Significant margarita-wound erythema.?  o ?Moderate evidence of ascending cellulitis or lymphangitis.?  - H&P documents, ?Wound sustained 10/7 in [place] after slipping on pool stair and scraping L leg. [employer] pool. Denies injury elsewhere. Rested, iced, elevated leg following week with some relief but still noted increasing swelling, redness,  pain. Friday 10/14 went to urgent care due to severe pain.?  Treatment:  - I&D at bedside per Dr. Kline on 10/16  - Zosyn 3.375g - 4.5g IVPB x 7 doses  - Vancomycin 1500mg IVPB x 1 on 10/16  Risk Factors:  - 63 y o with history of DM2, BPH, and alcohol use.  Options provided:  -- Cellulitis is related to diabetes  -- Cellulitis is unrelated to diabetes  -- Other - I will add my own diagnosis  -- Refer to Clinical Documentation Reviewer    Query created by: Cherri Dalal on 10/18/2023 12:39 PM      Electronically signed by:  PADMINI RAMIREZ DPM 10/19/2023 8:30 AM

## 2023-10-19 NOTE — CONSULTS
Vancomycin Dosing by Pharmacy- Cessation of Therapy    Consult to pharmacy for vancomycin dosing has been discontinued by the prescriber, pharmacy will sign off at this time.    Please call pharmacy if there are further questions or re-enter a consult if vancomycin is resumed.     Brandyn Albrecht, PharmD

## 2023-10-19 NOTE — CONSULTS
"Inpatient Diabetes Education Consult    Reason for Visit:  Shoaib Cheng is a 63 y.o. male who presents for abscess of the left leg    Consulting Service/Provider: Misael Jordan DO    Visit Type: Initial visit    Visit Modality: In-person    Discharge Equipment/Supply Needs:       Patient has supplies at home:  none    Patient History and Assessment:  New diagnosis: Type 2  Previous diagnosis:     Patient known to Diabetes Education department: No  Treatment prior to hospital admission:     Complications: none  PTA Medications:    Current Outpatient Medications   Medication Instructions    Accu-Chek Guide Me Glucose Mtr misc test once daily    Accu-Chek Guide test strips strip TEST daily    Accu-Chek Softclix Lancets misc use 1 LANCET to TEST BLOOD SUGAR once daily    metFORMIN XR (GLUCOPHAGE-XR) 750 mg, oral, Daily with evening meal, Do not crush, chew, or split.    sildenafil (VIAGRA) 50 mg, oral, Daily PRN    tamsulosin (FLOMAX) 0.4 mg, oral, Daily before breakfast       Glucose   Date/Time Value Ref Range Status   10/19/2023 06:19  (H) 74 - 99 mg/dL Final   10/18/2023 06:25  (H) 74 - 99 mg/dL Final   10/17/2023 04:41  (H) 74 - 99 mg/dL Final   10/16/2023 07:34  (H) 74 - 99 mg/dL Final   08/08/2023 08:22  (H) 74 - 99 mg/dL Final   07/06/2022 10:00  (H) 74 - 99 mg/dL Final     No results found for: \"CPEPTIDE\"  Hemoglobin A1C   Date Value Ref Range Status   08/08/2023 10.0 (A) % Final     Comment:          Diagnosis of Diabetes-Adults   Non-Diabetic: < or = 5.6%   Increased risk for developing diabetes: 5.7-6.4%   Diagnostic of diabetes: > or = 6.5%  .       Monitoring of Diabetes                Age (y)     Therapeutic Goal (%)   Adults:          >18           <7.0   Pediatrics:    13-18           <7.5                   7-12           <8.0                   0- 6            7.5-8.5   American Diabetes Association. Diabetes Care 33(S1), Jan 2010.       Patient " Learning/Readiness Assessment:  Ongoing, patient was sitting up in bed, family at bedside.  Patient was acceptable to diabetes education, we discussed the importance of proper medications and I gave the patient and his wife referrals for follow up with an endocrinologist provider.      Interventions/Topics Covered:  See After Visit Summary for handouts/information sheets provided to patient.  Education Documentation  No documentation found.        Additional topics covered: diabetes management booklet and ADA booklet.  Additional materials provided: referral provider list         Education Outcome/Recommendations:    Patient would benefit with a follow up to a endocrinologist after discharge.      Recommendations for bedside nursing:       Recommendations for Providers: Follow-up w/ PCP and/or Endocrinology and Diabetes supplies needed for discharge    Additional Comments: no further diabetes educational needs at this time.      Marcie Lassiter MSN, RN. [facility]  Certified Diabetes Educator  [facility]  [id number]

## 2023-10-19 NOTE — DISCHARGE SUMMARY
Discharge Diagnosis  Abscess of left leg    Issues Requiring Follow-Up  Diabetes management follow-up with PCP  Wound and cellulitis of the left leg follow-up with podiatry    Discharge Meds     Your medication list        START taking these medications        Instructions Last Dose Given Next Dose Due   amoxicillin-pot clavulanate 875-125 mg tablet  Commonly known as: Augmentin      Take 1 tablet (875 mg) by mouth every 12 hours for 14 days.       glimepiride 2 mg tablet  Commonly known as: Amaryl  Start taking on: October 20, 2023      Take 1 tablet (2 mg) by mouth once daily with a meal. Do not start before October 20, 2023.       melatonin 3 mg tablet      Take 1 tablet (3 mg) by mouth once daily at bedtime.       metFORMIN 500 mg tablet  Commonly known as: Glucophage  Replaces: metFORMIN  mg 24 hr tablet      Take 2 tablets (1,000 mg) by mouth 2 times a day with meals. Start with 1 tablet twice a day for 1 week then proceed with 2 tablets twice a day for the remaining prescription       oxyCODONE 5 mg immediate release tablet  Commonly known as: Roxicodone      Take 1 tablet (5 mg) by mouth every 6 hours if needed for moderate pain (4 - 6).       pantoprazole 40 mg EC tablet  Commonly known as: ProtoNix  Start taking on: October 20, 2023      Take 1 tablet (40 mg) by mouth once daily in the morning. Take before meals. Do not crush, chew, or split. Do not start before October 20, 2023.              CONTINUE taking these medications        Instructions Last Dose Given Next Dose Due   Accu-Chek Guide Me Glucose Mtr misc  Generic drug: blood-glucose meter           Accu-Chek Guide test strips strip  Generic drug: blood sugar diagnostic           Accu-Chek Softclix Lancets misc  Generic drug: lancets           sildenafil 50 mg tablet  Commonly known as: Viagra           tamsulosin 0.4 mg 24 hr capsule  Commonly known as: Flomax                  STOP taking these medications      metFORMIN  mg 24 hr  tablet  Commonly known as: Glucophage-XR  Replaced by: metFORMIN 500 mg tablet                  Where to Get Your Medications        These medications were sent to [facility] #[id number] - [place], [place] - [address] [facility]  [id number] [place] [id number]      Phone: [phone]   amoxicillin-pot clavulanate 875-125 mg tablet  glimepiride 2 mg tablet  melatonin 3 mg tablet  metFORMIN 500 mg tablet  oxyCODONE 5 mg immediate release tablet  pantoprazole 40 mg EC tablet         Test Results Pending At Discharge  Pending Labs       Order Current Status    Blood Culture Preliminary result    Blood Culture Preliminary result    Tissue/Wound Culture/Smear Preliminary result    Tissue/Wound Culture/Smear Preliminary result            Hospital Course   Shoaib Cheng is a 63 y.o. male presenting with wound to E wound x 9 days. Wound sustained 10/7 in [place] after slipping on pool stair and scraping L leg. [employer] private pool. Denies injury elsewhere. Rested, iced, elevated leg following week with some relief but still noted increasing swelling, redness, pain. Friday 10/14 went to urgent care due to severe pain. Prescribed keflex, took 2-3 days, subsequently resented to ED 10/16 due to worsening pain, redness, swelling despite antibiotic. I&Ded in ED. Did note at some point during week, wound opened and drained clear, yellow fluid. Ambulating ok but painful. Denies f/c, CP, SOB, dyspnea, n/v, changes in stool or urine.      Patient was initially admitted to observation unit for further management, then upgraded to inpatient in consultation with podiatry.  CT of the tibia shows a 9.5 cm x 5.7 cm x 1.3 cm abscess in the upper pretibial soft tissue extending from the mid patella tendon to the posterior tibial diaphysis in the left lower extremity.  Cellulitis diffusely involving the left leg throughout its entire length most severe at the level of the abscess.  Patient was put on vancomycin and Zosyn, he  previously failed Keflex regimen after undergoing IND on October 16, 2023, when he presented with purulent abscess at the podiatry office.  During hospitalization patient underwent additional I&D, which shows no residual purulence after drainage and flushing, wound was packed.  Infectious disease was consulted, antibiotic with Zosyn and vancomycin was continued, then transition to Augmentin at discharge.  Patient was stable for discharge from podiatry standpoint, with follow-up at their office.  During his hospitalization blood glucose was fluctuating between the mid 200s to low 300s milligrams per deciliter patient stated that he is recently diagnosed with diabetes and put on metformin extended release 750 mg tablet daily.  We extensively discussed diabetic education, and the diabetic educator diet/dietitian was consulted for further education.  Glimepiride 2 mg daily was added to his regimen.  At discharge metformin was changed to 500 mg immediate release, to be started as 1 tablet twice daily for 1 week to achieve GI tolerance, did not proceed to 2 tablets twice daily as tolerated.  Diabetic supplies prescription sent to pharmacy.  Patient will need to follow-up with PCP and recommend referral to endocrinologist for further management of his diabetes.  Oxycodone immediate release 5 mg every 6 hours as needed moderate to severe pain prescribed at discharge for a total of 15 tablets.  OARRS reviewed.  Pertinent Physical Exam At Time of Discharge  Physical Exam  Constitutional: Alert active, cooperative not in acute distress  Eyes: PERRLA, clear sclera  ENMT: Moist mucosal membranes, no exudate  Head / Neck: Atraumatic, normocephalic, supple neck, JVP not visualized  Lungs: Patent airways, CTABL  Heart: RRR, S1S2, no murmurs appreciated, palpable pulses in all extremities  GI: Soft, NT, ND, bowel sounds present in all quadrants  MSK: Moves all extremities freely, no restriction  of ROM, no joint edema  Extremities:  Large area of cellulitis extending from the knee to near ankle in the left lower extremity.  Surgical incision site mid tibia packed with likely Xeroform, with serosanguineous drainage (dressings were removed at that time), no peripheral edema  : No Campos catheter inserted  Breast: Deferred  Neurological: AAO x 3 to person, place and date, facial muscles symmetrical, sensation intact, strength 4/4, no acute focal neurological deficits appreciated  Psychological: Appropriate mood and behavior   Outpatient Follow-Up  Future Appointments   Date Time Provider Department Center   2/5/2024  9:20 AM Jomar Garcia MD [id number] [place]         Misael Jordan DO

## 2023-10-19 NOTE — CARE PLAN
The patient's goals for the shift include to be admitted    The clinical goals for the shift include elevate LLE as much as possible    Over the shift, the patient did not make progress toward the following goals. Barriers to progression include   Problem: Pain  Goal: Performs ADL's with improved pain control throughout shift  Outcome: Progressing     Problem: Pain  Goal: Walks with improved pain control throughout the shift  Outcome: Progressing     Problem: Infection related to problem list condition  Goal: Infection will resolve through treatment  Outcome: Progressing   . Recommendations to address these barriers include .

## 2023-10-19 NOTE — CONSULTS
INFECTIOUS DISEASE INPATIENT INITIAL CONSULTATION    Referred By: Bryan Kline    Reason For Consult: left leg abscess    HPI:  This is a 63 y.o. male with PMH of DM II and BPH who presented with left leg infection.    He bumped leg while in a swimming pool in [place]. Noted to have hematoma with infection. No fever here but WBC was 19.6 and normalized now. This has been drained now and growing MSSA. Is on IV Vanc/Zosyn now. He is feeling better. Will be going home with wound care instructions and packing of wound. No fevers/chills. No other symptoms present.    Allergies:  Patient has no known allergies.     Vitals (Last 24 Hours):  Heart Rate:  [69-84]   Temp:  [36.6 °C (97.9 °F)-37.2 °C (99 °F)]   Resp:  [16-19]   BP: (112-159)/(76-87)   SpO2:  [96 %-99 %]      PHYSICAL EXAM:  Gen - NAD  LLE - just below knee there is a surgically drained abscess with packing present, the area is tender, has resolving erythema/swelling  Skin - no rash    MEDS:    Current Facility-Administered Medications:     acetaminophen (Tylenol) tablet 975 mg, 975 mg, oral, q6h PRN, Jory Conde PA-C, 975 mg at 10/18/23 2328    dextrose 10 % in water (D10W) infusion, 0.3 g/kg/hr, intravenous, Once PRN, Jory Conde PA-C    dextrose 50 % injection 25 g, 25 g, intravenous, q15 min PRN, Jory Conde PA-C    docusate sodium (Colace) capsule 100 mg, 100 mg, oral, BID, Jory Conde PA-C, 100 mg at 10/18/23 0900    enoxaparin (Lovenox) syringe 40 mg, 40 mg, subcutaneous, q24h, Jory Conde PA-C, 40 mg at 10/19/23 0849    [START ON 10/20/2023] glimepiride (Amaryl) tablet 2 mg, 2 mg, oral, Daily with breakfast, Misael Goudiaby, DO    glucagon (Glucagen) injection 1 mg, 1 mg, intramuscular, q15 min PRN, Jory D Axton, PA-C    insulin lispro (HumaLOG) injection 0-10 Units, 0-10 Units, subcutaneous, Before meals & nightly, Jory Conde PA-C, 6 Units at 10/19/23 0850    melatonin tablet 3 mg, 3 mg, oral, Daily, Jory Conde,  DEWAYNE    metFORMIN XR (Glucophage-XR) 24 hr tablet 750 mg, 750 mg, oral, Daily with evening meal, MICHAEL Chaudhary    morphine injection 2 mg, 2 mg, intravenous, q4h PRN, Jory Conde PA-C    oxyCODONE (Roxicodone) immediate release tablet 5 mg, 5 mg, oral, q6h PRN, Jory Conde PA-C    pantoprazole (ProtoNix) EC tablet 40 mg, 40 mg, oral, Daily before breakfast, 40 mg at 10/18/23 0615 **OR** pantoprazole (ProtoNix) injection 40 mg, 40 mg, intravenous, Daily before breakfast, Jory Conde PA-C    piperacillin-tazobactam-dextrose (Zosyn) IV 3.375 g, 3.375 g, intravenous, q6h, Gabbi Atwood MD, Stopped at 10/19/23 0612    tamsulosin (Flomax) 24 hr capsule 0.4 mg, 0.4 mg, oral, Daily before breakfast, MICHAEL Chaudhary, 0.4 mg at 10/19/23 0634    vancomycin (Vancocin) in dextrose 5% 250 mL IV 1,250 mg, 1,250 mg, intravenous, q12h, Jory Conde PA-C, 1,250 mg at 10/19/23 0210     LABS:  Lab Results   Component Value Date    WBC 11.0 10/19/2023    HGB 11.7 (L) 10/19/2023    HCT 35.3 (L) 10/19/2023    MCV 94 10/19/2023     10/19/2023      Results from last 72 hours   Lab Units 10/19/23  0619   SODIUM mmol/L 133*   POTASSIUM mmol/L 4.1   CHLORIDE mmol/L 98   CO2 mmol/L 30   BUN mg/dL 14   CREATININE mg/dL 0.90   GLUCOSE mg/dL 260*   CALCIUM mg/dL 9.2   ANION GAP mmol/L 9*   EGFR mL/min/1.73m*2 >90     Results from last 72 hours   Lab Units 10/17/23  0441   ALK PHOS U/L 255*   BILIRUBIN TOTAL mg/dL 0.8   PROTEIN TOTAL g/dL 6.3*   ALT U/L 53*   AST U/L 20   ALBUMIN g/dL 3.2*     Estimated Creatinine Clearance: 100 mL/min (by C-G formula based on SCr of 0.9 mg/dL).       IMAGING:  CT Left Tib/Fib 10/17  IMPRESSION:  1. 9.5 cm x 5.7 cm x 1.3 cm abscess in the upper pretibial soft  tissues extending from the mid patellar tendon to the upper tibial  diaphysis, abuts the tibial tuberosity and fascia of the anterior  compartment musculature in contains a tiny amount of internal gas  locules  that may be due to the fact that it drains to the skin  surface or attempted drainage. No deep compartment involvement, no  evidence of myositis, necrotizing fasciitis, or osteomyelitis.      2. Cellulitis diffusely involving the left leg throughout its entire  length, most severe at the level of the abscess. There is also milder  cellulitis of the thyroid is circumferential distally and involves  the medial and anteromedial aspects of the mid proximal left thigh.    ASSESSMENT/PLAN:    Left Leg Abscess due to MSSA - hx of injury while in a pool, possible there are some anaerobes or water born bugs in here to. Has been on IV Vanc/Zosyn and OR culture just with MSSA. He had been on Keflex before and not improving which I suspect is more due to lack of source control rather than abx mismatch.    OK to discharge today. Will send script for PO Augmentin 875mg BID for 2 weeks. Will have continued follow-up with wound care. Advised on completing abx fully and signs/symptoms of recurrent or new infection to watch for. He and wife are in agreement with plans.    Will sign off. Please call back with questions. Thanks! D/w JIM Walsh MD  [facility]  Office #[phone]

## 2023-10-19 NOTE — CONSULTS
Wound Care Consult     Visit Date: 10/19/2023      Patient Name: Shoaib Cheng         MRN: [id number]           YOB: 1960     Reason for Consult: Assessment completed. Education on wound dressing with step by step instructions provided. Additional supplies left at bedside. Patient's wife verbalizes understanding and no further questions at this time.   Patient will follow up with Dr. Kline Monday.         Pertinent Labs:   Albumin   Date Value Ref Range Status   10/17/2023 3.2 (L) 3.4 - 5.0 g/dL Final       Wound Assessment:  Wound 10/16/23 Pretibial Left;Proximal (Active)   Shape longated from knee down shin 10/17/23 0358   Wound Length (cm) 12 cm 10/17/23 0358   Wound Width (cm) 11.5 cm 10/17/23 0358   Wound Surface Area (cm^2) 138 cm^2 10/17/23 0358   Wound Depth (cm) 0.01 cm 10/17/23 0358   Wound Volume (cm^3) 1.38 cm^3 10/17/23 0358   State of Healing Slough 10/18/23 0800        Kianna Billingsley RN BSN,WCC,CWOCN  [phone]   10/19/2023  2:35 PM

## 2023-10-20 ENCOUNTER — DOCUMENTATION (OUTPATIENT)
Dept: PRIMARY CARE | Facility: CLINIC | Age: 63
End: 2023-10-20
Payer: COMMERCIAL

## 2023-10-20 ENCOUNTER — PATIENT OUTREACH (OUTPATIENT)
Dept: PRIMARY CARE | Facility: CLINIC | Age: 63
End: 2023-10-20
Payer: COMMERCIAL

## 2023-10-20 LAB
BACTERIA SPEC CULT: ABNORMAL
BACTERIA SPEC CULT: ABNORMAL
GRAM STN SPEC: ABNORMAL

## 2023-10-20 NOTE — PROGRESS NOTES
Discharge Facility: [facility]   Discharge Diagnosis: Abscess of left leg   Admission Date: 10/17/2023   Discharge Date: 10/19/2023     PCP Appointment Date: F/U tasked to PCP office for scheduling   Specialist Appointment Date: NA   Hospital Encounter and Summary: Linked

## 2023-10-21 LAB
BACTERIA BLD CULT: NORMAL
BACTERIA BLD CULT: NORMAL

## 2023-10-31 DIAGNOSIS — E11.9 TYPE 2 DIABETES MELLITUS WITHOUT COMPLICATION, WITHOUT LONG-TERM CURRENT USE OF INSULIN (MULTI): Primary | ICD-10-CM

## 2023-11-01 ENCOUNTER — TELEPHONE (OUTPATIENT)
Dept: ENDOCRINOLOGY | Facility: CLINIC | Age: 63
End: 2023-11-01
Payer: COMMERCIAL

## 2023-11-01 NOTE — TELEPHONE ENCOUNTER
Shoaib Cheng   Called seeking appointment with Dr. Toro for newly diagnosed type 2 diabetes. Explained that provider has closed practice to new patients and offered appointment with Mylene Lam . CNP. Patient agrreed and NPV virtual scheduled. Kathryn Reyes LPN

## 2023-11-10 ENCOUNTER — OFFICE VISIT (OUTPATIENT)
Dept: PRIMARY CARE | Facility: CLINIC | Age: 63
End: 2023-11-10
Payer: COMMERCIAL

## 2023-11-10 VITALS
WEIGHT: 225 LBS | BODY MASS INDEX: 31.38 KG/M2 | RESPIRATION RATE: 20 BRPM | HEART RATE: 95 BPM | TEMPERATURE: 98.2 F | SYSTOLIC BLOOD PRESSURE: 121 MMHG | DIASTOLIC BLOOD PRESSURE: 76 MMHG | OXYGEN SATURATION: 95 %

## 2023-11-10 DIAGNOSIS — N52.9 ERECTILE DYSFUNCTION, UNSPECIFIED ERECTILE DYSFUNCTION TYPE: ICD-10-CM

## 2023-11-10 DIAGNOSIS — L03.116 CELLULITIS OF LEFT LEG: ICD-10-CM

## 2023-11-10 DIAGNOSIS — R97.20 ELEVATED PSA: ICD-10-CM

## 2023-11-10 DIAGNOSIS — E11.9 TYPE 2 DIABETES MELLITUS WITHOUT COMPLICATION, WITHOUT LONG-TERM CURRENT USE OF INSULIN (MULTI): Primary | ICD-10-CM

## 2023-11-10 PROCEDURE — 3074F SYST BP LT 130 MM HG: CPT | Performed by: FAMILY MEDICINE

## 2023-11-10 PROCEDURE — 99214 OFFICE O/P EST MOD 30 MIN: CPT | Performed by: FAMILY MEDICINE

## 2023-11-10 PROCEDURE — 1111F DSCHRG MED/CURRENT MED MERGE: CPT | Performed by: FAMILY MEDICINE

## 2023-11-10 PROCEDURE — 1125F AMNT PAIN NOTED PAIN PRSNT: CPT | Performed by: FAMILY MEDICINE

## 2023-11-10 PROCEDURE — 1036F TOBACCO NON-USER: CPT | Performed by: FAMILY MEDICINE

## 2023-11-10 PROCEDURE — 1159F MED LIST DOCD IN RCRD: CPT | Performed by: FAMILY MEDICINE

## 2023-11-10 PROCEDURE — 3078F DIAST BP <80 MM HG: CPT | Performed by: FAMILY MEDICINE

## 2023-11-10 PROCEDURE — 3046F HEMOGLOBIN A1C LEVEL >9.0%: CPT | Performed by: FAMILY MEDICINE

## 2023-11-10 RX ORDER — BLOOD SUGAR DIAGNOSTIC
STRIP MISCELLANEOUS
Qty: 100 STRIP | Refills: 2 | Status: SHIPPED | OUTPATIENT
Start: 2023-11-10 | End: 2023-11-15 | Stop reason: SDUPTHER

## 2023-11-10 RX ORDER — TADALAFIL 5 MG/1
5 TABLET ORAL DAILY PRN
Qty: 30 TABLET | Refills: 2 | Status: SHIPPED | OUTPATIENT
Start: 2023-11-10

## 2023-11-10 ASSESSMENT — ENCOUNTER SYMPTOMS
CARDIOVASCULAR NEGATIVE: 1
RESPIRATORY NEGATIVE: 1
NUMBNESS: 0
CONSTITUTIONAL NEGATIVE: 1
WOUND: 1

## 2023-11-10 NOTE — PROGRESS NOTES
Subjective   Patient ID: Shoaib Cheng is a 63 y.o. male who presents for Establish Care (Need new primary care, referred by close friend, dr witt. Previously with dr yo in [place].  ) and Hospital Follow-up.    Is doing a lot better.  He said no chest pain or shortness of breath.  The swelling in his leg is improved.  The redness has.  He is seeing wound specialist.  Patient is increased his metformin to thousand twice a day.  This is improved his glucose numbers.  There started to stay in low 130s to 150s.  Patient not have any fever chills no chest pain, patient tolerating the medication.  Has been off antibiotics for a while.  He sees the wound center at least twice a week.  Patient still not exercising still just on everything he will out.  He has an appointment with endocrinology next week.  Patient also has a history of possible enlarged prostate, trouble with erections.  He has Viagra in the past that did not seem to work.  He is on Flomax.  Patient is due for hemoglobin A1c in a few weeks.         Review of Systems   Constitutional: Negative.    Respiratory: Negative.     Cardiovascular: Negative.    Skin:  Positive for wound.   Neurological:  Negative for numbness.       Objective   /76   Pulse 95   Temp 36.8 °C (98.2 °F)   Resp 20   Wt 102 kg (225 lb)   SpO2 95%   BMI 31.38 kg/m²     Physical Exam  Constitutional:       Appearance: Normal appearance.   Cardiovascular:      Rate and Rhythm: Normal rate and regular rhythm.      Pulses: Normal pulses.      Heart sounds: Normal heart sounds.   Pulmonary:      Effort: Pulmonary effort is normal.      Breath sounds: Normal breath sounds.   Skin:     Comments: Patient has slight swelling around his wound.  Does have a healing wound roughly 2 inches below the tibial tuberosity.  Does have little scrape roughly 2 inches lower.  No drainage.  slight swelling.  Appears to be healing up pretty well   Neurological:      Mental Status: He is alert.       Sensory: No sensory deficit.         Assessment/Plan   Problem List Items Addressed This Visit       Elevated PSA    Relevant Medications    tadalafil (Cialis) 5 mg tablet    Type 2 diabetes mellitus without complication, without long-term current use of insulin (CMS/[facility]) - Primary    Relevant Medications    Accu-Chek Guide test strips strip     Other Visit Diagnoses       Cellulitis of left leg        Relevant Orders    CBC    Comprehensive Metabolic Panel    Erectile dysfunction, unspecified erectile dysfunction type              Leg appears to be healing well.  Continue to follow wound clinic.  Patient's glucose numbers improved.  He has a endocrinologist chest pain medication until then.  We will order blood work we will get this at the same time he sees endocrinology.  Patient aware if any redness swelling any pain any chest pain shortness of breath any nausea vomiting diarrhea fever headache any concerning symptoms go to ER  2 months depending on appointment and how wound is healing

## 2023-11-15 ENCOUNTER — LAB (OUTPATIENT)
Dept: LAB | Facility: LAB | Age: 63
End: 2023-11-15
Payer: COMMERCIAL

## 2023-11-15 ENCOUNTER — TELEMEDICINE (OUTPATIENT)
Dept: ENDOCRINOLOGY | Facility: CLINIC | Age: 63
End: 2023-11-15
Payer: COMMERCIAL

## 2023-11-15 DIAGNOSIS — E66.09 CLASS 1 OBESITY DUE TO EXCESS CALORIES WITH SERIOUS COMORBIDITY AND BODY MASS INDEX (BMI) OF 30.0 TO 30.9 IN ADULT: Primary | ICD-10-CM

## 2023-11-15 DIAGNOSIS — L03.116 CELLULITIS OF LEFT LEG: ICD-10-CM

## 2023-11-15 DIAGNOSIS — E78.5 HYPERLIPIDEMIA, UNSPECIFIED HYPERLIPIDEMIA TYPE: ICD-10-CM

## 2023-11-15 DIAGNOSIS — E11.9 TYPE 2 DIABETES MELLITUS WITHOUT COMPLICATION, WITHOUT LONG-TERM CURRENT USE OF INSULIN (MULTI): ICD-10-CM

## 2023-11-15 LAB
ALBUMIN SERPL BCP-MCNC: 4.2 G/DL (ref 3.4–5)
ALP SERPL-CCNC: 57 U/L (ref 33–136)
ALT SERPL W P-5'-P-CCNC: 10 U/L (ref 10–52)
ANION GAP SERPL CALC-SCNC: 13 MMOL/L (ref 10–20)
AST SERPL W P-5'-P-CCNC: 11 U/L (ref 9–39)
BILIRUB SERPL-MCNC: 0.8 MG/DL (ref 0–1.2)
BUN SERPL-MCNC: 13 MG/DL (ref 6–23)
CALCIUM SERPL-MCNC: 9.4 MG/DL (ref 8.6–10.3)
CHLORIDE SERPL-SCNC: 103 MMOL/L (ref 98–107)
CO2 SERPL-SCNC: 26 MMOL/L (ref 21–32)
CREAT SERPL-MCNC: 0.81 MG/DL (ref 0.5–1.3)
CREAT UR-MCNC: 83.7 MG/DL (ref 20–370)
ERYTHROCYTE [DISTWIDTH] IN BLOOD BY AUTOMATED COUNT: 12.2 % (ref 11.5–14.5)
GFR SERPL CREATININE-BSD FRML MDRD: >90 ML/MIN/1.73M*2
GLUCOSE SERPL-MCNC: 112 MG/DL (ref 74–99)
HCT VFR BLD AUTO: 37.4 % (ref 41–52)
HGB BLD-MCNC: 12.7 G/DL (ref 13.5–17.5)
MCH RBC QN AUTO: 31 PG (ref 26–34)
MCHC RBC AUTO-ENTMCNC: 34 G/DL (ref 32–36)
MCV RBC AUTO: 91 FL (ref 80–100)
MICROALBUMIN UR-MCNC: <7 MG/L
MICROALBUMIN/CREAT UR: NORMAL MG/G{CREAT}
NRBC BLD-RTO: 0 /100 WBCS (ref 0–0)
PLATELET # BLD AUTO: 219 X10*3/UL (ref 150–450)
POTASSIUM SERPL-SCNC: 4.1 MMOL/L (ref 3.5–5.3)
PROT SERPL-MCNC: 7.1 G/DL (ref 6.4–8.2)
RBC # BLD AUTO: 4.1 X10*6/UL (ref 4.5–5.9)
SODIUM SERPL-SCNC: 138 MMOL/L (ref 136–145)
WBC # BLD AUTO: 8.1 X10*3/UL (ref 4.4–11.3)

## 2023-11-15 PROCEDURE — 82043 UR ALBUMIN QUANTITATIVE: CPT

## 2023-11-15 PROCEDURE — 84681 ASSAY OF C-PEPTIDE: CPT

## 2023-11-15 PROCEDURE — 80053 COMPREHEN METABOLIC PANEL: CPT

## 2023-11-15 PROCEDURE — 83519 RIA NONANTIBODY: CPT

## 2023-11-15 PROCEDURE — 85027 COMPLETE CBC AUTOMATED: CPT

## 2023-11-15 PROCEDURE — 82570 ASSAY OF URINE CREATININE: CPT

## 2023-11-15 PROCEDURE — 86341 ISLET CELL ANTIBODY: CPT

## 2023-11-15 PROCEDURE — 99204 OFFICE O/P NEW MOD 45 MIN: CPT | Performed by: NURSE PRACTITIONER

## 2023-11-15 PROCEDURE — 36415 COLL VENOUS BLD VENIPUNCTURE: CPT

## 2023-11-15 RX ORDER — METFORMIN HYDROCHLORIDE 500 MG/1
TABLET ORAL
Qty: 360 TABLET | Refills: 3 | Status: SHIPPED | OUTPATIENT
Start: 2023-11-15

## 2023-11-15 RX ORDER — BLOOD SUGAR DIAGNOSTIC
STRIP MISCELLANEOUS
Qty: 100 STRIP | Refills: 2 | Status: SHIPPED | OUTPATIENT
Start: 2023-11-15 | End: 2024-04-08

## 2023-11-15 RX ORDER — TIRZEPATIDE 5 MG/.5ML
5 INJECTION, SOLUTION SUBCUTANEOUS
Qty: 2 ML | Refills: 3 | Status: SHIPPED | OUTPATIENT
Start: 2023-11-15 | End: 2024-03-04

## 2023-11-15 ASSESSMENT — ENCOUNTER SYMPTOMS
WEAKNESS: 0
DIZZINESS: 0
CONSTIPATION: 0
APPETITE CHANGE: 0
FREQUENCY: 0
NAUSEA: 0
SEIZURES: 0
NERVOUS/ANXIOUS: 0
PALPITATIONS: 0
SHORTNESS OF BREATH: 0
ACTIVITY CHANGE: 0
POLYDIPSIA: 0
DIARRHEA: 0
SLEEP DISTURBANCE: 0
POLYPHAGIA: 0
FATIGUE: 0
NUMBNESS: 0

## 2023-11-16 DIAGNOSIS — D64.9 ANEMIA, UNSPECIFIED TYPE: Primary | ICD-10-CM

## 2023-11-16 LAB — C PEPTIDE SERPL-MCNC: 1.8 NG/ML (ref 0.7–3.9)

## 2023-11-18 LAB — GAD65 AB SER IA-ACNC: <5 IU/ML (ref 0–5)

## 2023-11-19 LAB — ISLET CELL512 AB SER IA-ACNC: <5.4 U/ML (ref 0–7.4)

## 2023-12-22 ENCOUNTER — TELEPHONE (OUTPATIENT)
Dept: PRIMARY CARE | Facility: CLINIC | Age: 63
End: 2023-12-22
Payer: COMMERCIAL

## 2023-12-22 NOTE — TELEPHONE ENCOUNTER
----- Message from Glenroy Powell DO sent at 12/21/2023 12:47 PM EST -----  Patient is due for hemoglobin A1c.  Can we see if he can come in Friday just to get it checked.  He was started on medication by endocrinology over a month ago.  He was due for one last month  I left a message with the patient

## 2023-12-29 ENCOUNTER — CLINICAL SUPPORT (OUTPATIENT)
Dept: PRIMARY CARE | Facility: CLINIC | Age: 63
End: 2023-12-29
Payer: COMMERCIAL

## 2023-12-29 DIAGNOSIS — E11.9 TYPE 2 DIABETES MELLITUS WITHOUT COMPLICATION, WITHOUT LONG-TERM CURRENT USE OF INSULIN (MULTI): Primary | ICD-10-CM

## 2023-12-29 LAB — POC HEMOGLOBIN A1C: 6.4 % (ref 4.2–6.5)

## 2023-12-29 PROCEDURE — 83036 HEMOGLOBIN GLYCOSYLATED A1C: CPT | Performed by: STUDENT IN AN ORGANIZED HEALTH CARE EDUCATION/TRAINING PROGRAM

## 2024-01-12 ENCOUNTER — APPOINTMENT (OUTPATIENT)
Dept: PRIMARY CARE | Facility: CLINIC | Age: 64
End: 2024-01-12
Payer: COMMERCIAL

## 2024-01-18 ASSESSMENT — ENCOUNTER SYMPTOMS
VISUAL CHANGE: 0
BLURRED VISION: 0
CONFUSION: 0
HUNGER: 0
TREMORS: 0
SEIZURES: 0
WEIGHT LOSS: 0
SWEATS: 0
WEAKNESS: 0
POLYDIPSIA: 0
DIZZINESS: 0
HEADACHES: 0
FATIGUE: 0
NERVOUS/ANXIOUS: 0
BLACKOUTS: 0
POLYPHAGIA: 0
SPEECH DIFFICULTY: 0

## 2024-01-19 ENCOUNTER — OFFICE VISIT (OUTPATIENT)
Dept: PRIMARY CARE | Facility: CLINIC | Age: 64
End: 2024-01-19
Payer: COMMERCIAL

## 2024-01-19 VITALS
TEMPERATURE: 98.2 F | SYSTOLIC BLOOD PRESSURE: 126 MMHG | DIASTOLIC BLOOD PRESSURE: 80 MMHG | BODY MASS INDEX: 29.82 KG/M2 | RESPIRATION RATE: 18 BRPM | WEIGHT: 213 LBS | HEART RATE: 78 BPM | HEIGHT: 71 IN | OXYGEN SATURATION: 98 %

## 2024-01-19 DIAGNOSIS — E11.9 TYPE 2 DIABETES MELLITUS WITHOUT COMPLICATION, WITHOUT LONG-TERM CURRENT USE OF INSULIN (MULTI): ICD-10-CM

## 2024-01-19 DIAGNOSIS — N52.9 ERECTILE DYSFUNCTION, UNSPECIFIED ERECTILE DYSFUNCTION TYPE: Primary | ICD-10-CM

## 2024-01-19 DIAGNOSIS — L97.922 NON-PRESSURE CHRONIC ULCER OF LEFT LOWER LEG WITH FAT LAYER EXPOSED (MULTI): ICD-10-CM

## 2024-01-19 PROCEDURE — 3008F BODY MASS INDEX DOCD: CPT | Performed by: FAMILY MEDICINE

## 2024-01-19 PROCEDURE — 1125F AMNT PAIN NOTED PAIN PRSNT: CPT | Performed by: FAMILY MEDICINE

## 2024-01-19 PROCEDURE — 3079F DIAST BP 80-89 MM HG: CPT | Performed by: FAMILY MEDICINE

## 2024-01-19 PROCEDURE — 1159F MED LIST DOCD IN RCRD: CPT | Performed by: FAMILY MEDICINE

## 2024-01-19 PROCEDURE — 1036F TOBACCO NON-USER: CPT | Performed by: FAMILY MEDICINE

## 2024-01-19 PROCEDURE — 99213 OFFICE O/P EST LOW 20 MIN: CPT | Performed by: FAMILY MEDICINE

## 2024-01-19 PROCEDURE — 3074F SYST BP LT 130 MM HG: CPT | Performed by: FAMILY MEDICINE

## 2024-01-19 RX ORDER — SILDENAFIL 50 MG/1
100 TABLET, FILM COATED ORAL DAILY PRN
Qty: 10 TABLET | Refills: 1 | Status: SHIPPED | OUTPATIENT
Start: 2024-01-19 | End: 2024-02-26 | Stop reason: SDUPTHER

## 2024-01-19 ASSESSMENT — ENCOUNTER SYMPTOMS
TREMORS: 0
FATIGUE: 0
SEIZURES: 0
SPEECH DIFFICULTY: 0
WEAKNESS: 0
SWEATS: 0
HUNGER: 0
POLYPHAGIA: 0
CONFUSION: 0
HEADACHES: 0
WEIGHT LOSS: 0
VISUAL CHANGE: 0
POLYDIPSIA: 0
NERVOUS/ANXIOUS: 0
BLACKOUTS: 0
DIZZINESS: 0
BLURRED VISION: 0

## 2024-01-19 NOTE — PROGRESS NOTES
Subjective   Patient ID: Shoaib Cheng is a 63 y.o. male who presents for Follow-up (Follow up on DM. Last A1C 12/29/23 6.4) and Med Refill (Med refill on Tadalafil. He is asking if there is something stronger.).    Patient has no chest pain no shortness of breath.  Doing well with the medication.  No abdominal pain.  Patient feels like his sugars are better.  He is trying to increase physical activity.  He saw his ophthalmologist.  Patient still having some issues with erections.  The 50 mg did not work well.  Was running if he can increase the dose.  No side effects of medication without    Diabetes  He has type 2 diabetes mellitus. No MedicAlert identification noted. The initial diagnosis of diabetes was made 2 months ago. Pertinent negatives for hypoglycemia include no confusion, dizziness, headaches, hunger, mood changes, nervousness/anxiousness, pallor, seizures, sleepiness, speech difficulty, sweats or tremors. Pertinent negatives for diabetes include no blurred vision, no chest pain, no fatigue, no foot paresthesias, no foot ulcerations, no polydipsia, no polyphagia, no polyuria, no visual change, no weakness and no weight loss. Pertinent negatives for hypoglycemia complications include no blackouts, no hospitalization, no nocturnal hypoglycemia, no required assistance and no required glucagon injection. Symptoms are stable. Diabetic complications include impotence. Pertinent negatives for diabetic complications include no CVA, heart disease, nephropathy, peripheral neuropathy, PVD or retinopathy. Current diabetic treatment includes oral agent (dual therapy). He is compliant with treatment all of the time. His weight is stable. He is following a generally healthy diet. Meal planning includes avoidance of concentrated sweets. He has not had a previous visit with a dietitian. He participates in exercise intermittently. He monitors blood glucose at home 1-2 x per day. Blood glucose monitoring compliance is  "excellent. His home blood glucose trend is decreasing steadily. His breakfast blood glucose is taken after 10 am. His breakfast blood glucose range is generally 110-130 mg/dl. His lunch blood glucose range is generally 110-130 mg/dl. He sees a podiatrist.Eye exam is current.        Review of Systems   Constitutional:  Negative for fatigue and weight loss.   Eyes:  Negative for blurred vision.   Cardiovascular:  Negative for chest pain.   Endocrine: Negative for polydipsia, polyphagia and polyuria.   Genitourinary:  Positive for impotence.   Skin:  Negative for pallor.   Neurological:  Negative for dizziness, tremors, seizures, speech difficulty, weakness and headaches.   Psychiatric/Behavioral:  Negative for confusion. The patient is not nervous/anxious.        Objective   /80 (BP Location: Right arm, Patient Position: Sitting)   Pulse 78   Temp 36.8 °C (98.2 °F)   Resp 18   Ht 1.803 m (5' 11\")   Wt 96.6 kg (213 lb)   SpO2 98%   BMI 29.71 kg/m²     Physical Exam    Assessment/Plan   Problem List Items Addressed This Visit       Type 2 diabetes mellitus without complication, without long-term current use of insulin (CMS/[facility])    Relevant Orders    Basic Metabolic Panel    Hemoglobin A1C    Non-pressure chronic ulcer of left lower leg with fat layer exposed (CMS/[facility])     resolved          Other Visit Diagnoses       Erectile dysfunction, unspecified erectile dysfunction type    -  Primary    Relevant Medications    sildenafil (Viagra) 50 mg tablet          Patient will continue diabetes medication.  May be able to remove the metformin if his A1c is better in 3 months.  Patient can try 100 mg of Viagra.  He is aware of the side effects.  If any chest pain shortness of breath any nausea vomiting or any numbness tingling any worsening symptoms go to the ER  Follow-up in 3 to 4 months     "

## 2024-02-05 ENCOUNTER — APPOINTMENT (OUTPATIENT)
Dept: PRIMARY CARE | Facility: CLINIC | Age: 64
End: 2024-02-05
Payer: COMMERCIAL

## 2024-02-25 DIAGNOSIS — E11.9 TYPE 2 DIABETES MELLITUS WITHOUT COMPLICATION, WITHOUT LONG-TERM CURRENT USE OF INSULIN (MULTI): ICD-10-CM

## 2024-02-26 DIAGNOSIS — N52.9 ERECTILE DYSFUNCTION, UNSPECIFIED ERECTILE DYSFUNCTION TYPE: ICD-10-CM

## 2024-02-26 RX ORDER — LANCETS
EACH MISCELLANEOUS
Qty: 30 EACH | Refills: 3 | Status: SHIPPED | OUTPATIENT
Start: 2024-02-26

## 2024-02-26 RX ORDER — SILDENAFIL 50 MG/1
100 TABLET, FILM COATED ORAL DAILY PRN
Qty: 10 TABLET | Refills: 1 | Status: SHIPPED | OUTPATIENT
Start: 2024-02-26 | End: 2024-03-08 | Stop reason: SDUPTHER

## 2024-03-04 RX ORDER — TIRZEPATIDE 5 MG/.5ML
INJECTION, SOLUTION SUBCUTANEOUS
Qty: 2 ML | Refills: 1 | Status: SHIPPED | OUTPATIENT
Start: 2024-03-04

## 2024-03-08 DIAGNOSIS — N52.9 ERECTILE DYSFUNCTION, UNSPECIFIED ERECTILE DYSFUNCTION TYPE: ICD-10-CM

## 2024-03-08 RX ORDER — SILDENAFIL 50 MG/1
100 TABLET, FILM COATED ORAL DAILY PRN
Qty: 10 TABLET | Refills: 1 | Status: SHIPPED | OUTPATIENT
Start: 2024-03-08

## 2024-03-11 ENCOUNTER — LAB (OUTPATIENT)
Dept: LAB | Facility: LAB | Age: 64
End: 2024-03-11
Payer: COMMERCIAL

## 2024-03-11 DIAGNOSIS — E11.9 TYPE 2 DIABETES MELLITUS WITHOUT COMPLICATION, WITHOUT LONG-TERM CURRENT USE OF INSULIN (MULTI): ICD-10-CM

## 2024-03-11 DIAGNOSIS — D64.9 ANEMIA, UNSPECIFIED TYPE: ICD-10-CM

## 2024-03-11 LAB
ANION GAP SERPL CALC-SCNC: 12 MMOL/L (ref 10–20)
BASOPHILS # BLD AUTO: 0.12 X10*3/UL (ref 0–0.1)
BASOPHILS NFR BLD AUTO: 1.2 %
BUN SERPL-MCNC: 15 MG/DL (ref 6–23)
CALCIUM SERPL-MCNC: 9.7 MG/DL (ref 8.6–10.6)
CHLORIDE SERPL-SCNC: 103 MMOL/L (ref 98–107)
CO2 SERPL-SCNC: 27 MMOL/L (ref 21–32)
CREAT SERPL-MCNC: 0.79 MG/DL (ref 0.5–1.3)
EGFRCR SERPLBLD CKD-EPI 2021: >90 ML/MIN/1.73M*2
EOSINOPHIL # BLD AUTO: 0.36 X10*3/UL (ref 0–0.7)
EOSINOPHIL NFR BLD AUTO: 3.6 %
ERYTHROCYTE [DISTWIDTH] IN BLOOD BY AUTOMATED COUNT: 12.8 % (ref 11.5–14.5)
EST. AVERAGE GLUCOSE BLD GHB EST-MCNC: 126 MG/DL
GLUCOSE SERPL-MCNC: 140 MG/DL (ref 74–99)
HBA1C MFR BLD: 6 %
HCT VFR BLD AUTO: 42.2 % (ref 41–52)
HGB BLD-MCNC: 14.3 G/DL (ref 13.5–17.5)
IMM GRANULOCYTES # BLD AUTO: 0.03 X10*3/UL (ref 0–0.7)
IMM GRANULOCYTES NFR BLD AUTO: 0.3 % (ref 0–0.9)
LYMPHOCYTES # BLD AUTO: 3.12 X10*3/UL (ref 1.2–4.8)
LYMPHOCYTES NFR BLD AUTO: 31.5 %
MCH RBC QN AUTO: 30.7 PG (ref 26–34)
MCHC RBC AUTO-ENTMCNC: 33.9 G/DL (ref 32–36)
MCV RBC AUTO: 91 FL (ref 80–100)
MONOCYTES # BLD AUTO: 0.75 X10*3/UL (ref 0.1–1)
MONOCYTES NFR BLD AUTO: 7.6 %
NEUTROPHILS # BLD AUTO: 5.52 X10*3/UL (ref 1.2–7.7)
NEUTROPHILS NFR BLD AUTO: 55.8 %
NRBC BLD-RTO: 0 /100 WBCS (ref 0–0)
PLATELET # BLD AUTO: 280 X10*3/UL (ref 150–450)
POTASSIUM SERPL-SCNC: 4.4 MMOL/L (ref 3.5–5.3)
RBC # BLD AUTO: 4.66 X10*6/UL (ref 4.5–5.9)
SODIUM SERPL-SCNC: 138 MMOL/L (ref 136–145)
WBC # BLD AUTO: 9.9 X10*3/UL (ref 4.4–11.3)

## 2024-03-11 PROCEDURE — 85025 COMPLETE CBC W/AUTO DIFF WBC: CPT

## 2024-03-11 PROCEDURE — 83036 HEMOGLOBIN GLYCOSYLATED A1C: CPT

## 2024-03-11 PROCEDURE — 80048 BASIC METABOLIC PNL TOTAL CA: CPT

## 2024-03-11 PROCEDURE — 36415 COLL VENOUS BLD VENIPUNCTURE: CPT

## 2024-03-11 NOTE — RESULT ENCOUNTER NOTE
/ Prosper your kidney function appear fine  Your blood counts appear normal.  No anemia, that has resolved   still waiting on hemoglobin A1c

## 2024-03-11 NOTE — RESULT ENCOUNTER NOTE
Mr Cheng your hemoglobin A1c is 6.0.  This is very good.  We can hold the metformin to see how your numbers are with just the injection and repeat in 3 months.  Or we can just continue on both medications.  Please let me know which you prefer

## 2024-04-06 DIAGNOSIS — E11.9 TYPE 2 DIABETES MELLITUS WITHOUT COMPLICATION, WITHOUT LONG-TERM CURRENT USE OF INSULIN (MULTI): ICD-10-CM

## 2024-04-08 RX ORDER — BLOOD SUGAR DIAGNOSTIC
STRIP MISCELLANEOUS
Qty: 200 STRIP | Refills: 0 | Status: SHIPPED | OUTPATIENT
Start: 2024-04-08

## 2024-05-24 DIAGNOSIS — E11.9 TYPE 2 DIABETES MELLITUS WITHOUT COMPLICATION, WITHOUT LONG-TERM CURRENT USE OF INSULIN (MULTI): Primary | ICD-10-CM

## 2024-06-05 NOTE — PROGRESS NOTES
"Subjective   Reason for Visit: Shoaib Cheng is an 63 y.o. male here for a Medicare Wellness visit.     Past Medical, Surgical, and Family History reviewed and updated in chart.         HPI  NO COMPLAINTS TODAY    SPOUSE IN ATTENDANCE    Patient Care Team:  Jomar Garcia MD as PCP - General (Internal Medicine)     Review of Systems   Constitutional:  Negative for chills, diaphoresis and fever.   Respiratory:  Negative for cough and shortness of breath.    Cardiovascular:  Negative for chest pain and leg swelling.   Gastrointestinal:  Negative for constipation, diarrhea, nausea and vomiting.   Musculoskeletal:  Negative for joint swelling and myalgias.       Objective   Vitals:  /90   Pulse 77   Temp 36.7 °C (98 °F)   Resp 16   Ht 1.81 m (5' 11.26\")   Wt 107 kg (236 lb 11.2 oz)   SpO2 98%   BMI 32.77 kg/m²       Physical Exam  Vitals reviewed.   Constitutional:       General: He is not in acute distress.     Appearance: He is not ill-appearing.   Cardiovascular:      Rate and Rhythm: Normal rate and regular rhythm.      Pulses: Normal pulses.      Heart sounds:      No gallop.   Pulmonary:      Breath sounds: Normal breath sounds. No wheezing, rhonchi or rales.   Abdominal:      General: Abdomen is flat. Bowel sounds are normal.      Palpations: Abdomen is soft.      Tenderness: There is no guarding or rebound.   Musculoskeletal:      Right lower leg: No edema.      Left lower leg: No edema.         Assessment/Plan   Problem List Items Addressed This Visit       Elevated PSA    Relevant Orders    PSA, total and free    Type 2 diabetes mellitus without complication, without long-term current use of insulin (CMS/[facility])    Relevant Orders    Vitamin B12 (Completed)    Lipid Panel (Completed)    TSH with reflex to Free T4 if abnormal (Completed)    Comprehensive Metabolic Panel (Completed)    CBC (Completed)    Hemoglobin A1C (Completed)     Other Visit Diagnoses       Medicare annual wellness visit, " Addended by: SANTOS BENTLEY on: 6/5/2024 03:33 PM     Modules accepted: Level of Service     initial    -  Primary    Relevant Orders    Hepatitis C Antibody (Completed)    Pneumococcal conjugate vaccine, 20-valent, adult (PREVNAR 20) (Completed)    Tdap vaccine, age 10 years and older  (BOOSTRIX) (Completed)    Benign prostatic hyperplasia with urinary frequency        Relevant Medications    tamsulosin (Flomax) 0.4 mg 24 hr capsule    Vitamin D deficiency        Relevant Orders    Vitamin D, Total (Completed)    Need for vaccination        Screening for colorectal cancer        Relevant Orders    Colonoscopy    Routine general medical examination at health care facility        Relevant Orders    Pneumococcal conjugate vaccine, 20-valent, adult (PREVNAR 20) (Completed)    Tdap vaccine, age 10 years and older  (BOOSTRIX) (Completed)          Patient Instructions    PREVNAR-20 PNEUMONIA IMMUNIZATION AND TETANUS/PERTUSSIS IMMUNIZATIONS ARE ADMINISTERED TO YOU TODAY    2.  SHINGRIX IMMUNIZATION SCRIPT PRINTED TO TAKE TO THE PHARMACY TO GET THAT    3.  COLONSOCOPY REFERRAL IS MADE FOR YOU    4.  TAMSULOSIN 0.4 MG DAILY FOR SYMPTOMS OF PROSTATE ENLARGEMENT, PLEASE USE CAUTIOUSLY WITH SILDENAFIL    5.  REGULAR DENTAL AND EYE CHECKS AS YOU ARE DOING    6.  A1C TO BE DONE WITH BLOOD TEST WILL GUAGE HOW WELL YOU ARE DOING WITH DIABETES    7.  6 MONTH FOLLOW UP FOR DIABETES IS RECOMMENDED    8.  REGULAR DIET, EXERCISE, AND WEIGHT LOSS IS RECOMMENDED FOR YOU AS FOR ALL

## 2024-06-18 DIAGNOSIS — R97.20 ELEVATED PSA: ICD-10-CM

## 2024-06-18 RX ORDER — TAMSULOSIN HYDROCHLORIDE 0.4 MG/1
0.4 CAPSULE ORAL DAILY
Qty: 30 CAPSULE | Refills: 2 | Status: SHIPPED | OUTPATIENT
Start: 2024-06-18

## 2024-08-13 ENCOUNTER — APPOINTMENT (OUTPATIENT)
Dept: PRIMARY CARE | Facility: CLINIC | Age: 64
End: 2024-08-13
Payer: COMMERCIAL

## 2024-08-13 ENCOUNTER — LAB (OUTPATIENT)
Dept: LAB | Facility: LAB | Age: 64
End: 2024-08-13
Payer: COMMERCIAL

## 2024-08-13 VITALS
SYSTOLIC BLOOD PRESSURE: 120 MMHG | RESPIRATION RATE: 16 BRPM | OXYGEN SATURATION: 98 % | HEIGHT: 72 IN | HEART RATE: 67 BPM | WEIGHT: 202 LBS | DIASTOLIC BLOOD PRESSURE: 72 MMHG | BODY MASS INDEX: 27.36 KG/M2 | TEMPERATURE: 97.9 F

## 2024-08-13 DIAGNOSIS — Z00.00 ROUTINE GENERAL MEDICAL EXAMINATION AT HEALTH CARE FACILITY: ICD-10-CM

## 2024-08-13 DIAGNOSIS — Z12.5 SCREENING FOR PROSTATE CANCER: ICD-10-CM

## 2024-08-13 DIAGNOSIS — E11.9 TYPE 2 DIABETES MELLITUS WITHOUT COMPLICATION, WITHOUT LONG-TERM CURRENT USE OF INSULIN (MULTI): ICD-10-CM

## 2024-08-13 DIAGNOSIS — Z00.00 ROUTINE GENERAL MEDICAL EXAMINATION AT HEALTH CARE FACILITY: Primary | ICD-10-CM

## 2024-08-13 LAB
ALBUMIN SERPL BCP-MCNC: 4.4 G/DL (ref 3.4–5)
ALP SERPL-CCNC: 52 U/L (ref 33–136)
ALT SERPL W P-5'-P-CCNC: 18 U/L (ref 10–52)
ANION GAP SERPL CALC-SCNC: 12 MMOL/L (ref 10–20)
AST SERPL W P-5'-P-CCNC: 14 U/L (ref 9–39)
BILIRUB SERPL-MCNC: 0.7 MG/DL (ref 0–1.2)
BUN SERPL-MCNC: 25 MG/DL (ref 6–23)
CALCIUM SERPL-MCNC: 9.6 MG/DL (ref 8.6–10.3)
CHLORIDE SERPL-SCNC: 105 MMOL/L (ref 98–107)
CHOLEST SERPL-MCNC: 161 MG/DL (ref 0–199)
CHOLESTEROL/HDL RATIO: 3.3
CO2 SERPL-SCNC: 26 MMOL/L (ref 21–32)
CREAT SERPL-MCNC: 0.95 MG/DL (ref 0.5–1.3)
CREAT UR-MCNC: 91.2 MG/DL (ref 20–370)
EGFRCR SERPLBLD CKD-EPI 2021: 88 ML/MIN/1.73M*2
ERYTHROCYTE [DISTWIDTH] IN BLOOD BY AUTOMATED COUNT: 12.6 % (ref 11.5–14.5)
EST. AVERAGE GLUCOSE BLD GHB EST-MCNC: 128 MG/DL
GLUCOSE SERPL-MCNC: 121 MG/DL (ref 74–99)
HBA1C MFR BLD: 6.1 %
HCT VFR BLD AUTO: 41.6 % (ref 41–52)
HDLC SERPL-MCNC: 48.6 MG/DL
HGB BLD-MCNC: 14.3 G/DL (ref 13.5–17.5)
LDLC SERPL CALC-MCNC: 95 MG/DL
MCH RBC QN AUTO: 32.2 PG (ref 26–34)
MCHC RBC AUTO-ENTMCNC: 34.4 G/DL (ref 32–36)
MCV RBC AUTO: 94 FL (ref 80–100)
MICROALBUMIN UR-MCNC: <7 MG/L
MICROALBUMIN/CREAT UR: NORMAL MG/G{CREAT}
NON HDL CHOLESTEROL: 112 MG/DL (ref 0–149)
NRBC BLD-RTO: 0 /100 WBCS (ref 0–0)
PLATELET # BLD AUTO: 252 X10*3/UL (ref 150–450)
POTASSIUM SERPL-SCNC: 4.7 MMOL/L (ref 3.5–5.3)
PROT SERPL-MCNC: 6.9 G/DL (ref 6.4–8.2)
PSA SERPL-MCNC: 9.41 NG/ML
RBC # BLD AUTO: 4.44 X10*6/UL (ref 4.5–5.9)
SODIUM SERPL-SCNC: 138 MMOL/L (ref 136–145)
TRIGL SERPL-MCNC: 87 MG/DL (ref 0–149)
VLDL: 17 MG/DL (ref 0–40)
WBC # BLD AUTO: 8.7 X10*3/UL (ref 4.4–11.3)

## 2024-08-13 PROCEDURE — 83036 HEMOGLOBIN GLYCOSYLATED A1C: CPT

## 2024-08-13 PROCEDURE — G0103 PSA SCREENING: HCPCS

## 2024-08-13 PROCEDURE — 82043 UR ALBUMIN QUANTITATIVE: CPT

## 2024-08-13 PROCEDURE — G0439 PPPS, SUBSEQ VISIT: HCPCS | Performed by: FAMILY MEDICINE

## 2024-08-13 PROCEDURE — 80061 LIPID PANEL: CPT

## 2024-08-13 PROCEDURE — 1123F ACP DISCUSS/DSCN MKR DOCD: CPT | Performed by: FAMILY MEDICINE

## 2024-08-13 PROCEDURE — 1160F RVW MEDS BY RX/DR IN RCRD: CPT | Performed by: FAMILY MEDICINE

## 2024-08-13 PROCEDURE — 82570 ASSAY OF URINE CREATININE: CPT

## 2024-08-13 PROCEDURE — 1036F TOBACCO NON-USER: CPT | Performed by: FAMILY MEDICINE

## 2024-08-13 PROCEDURE — 3008F BODY MASS INDEX DOCD: CPT | Performed by: FAMILY MEDICINE

## 2024-08-13 PROCEDURE — 36415 COLL VENOUS BLD VENIPUNCTURE: CPT

## 2024-08-13 PROCEDURE — 1159F MED LIST DOCD IN RCRD: CPT | Performed by: FAMILY MEDICINE

## 2024-08-13 PROCEDURE — 3044F HG A1C LEVEL LT 7.0%: CPT | Performed by: FAMILY MEDICINE

## 2024-08-13 PROCEDURE — 80053 COMPREHEN METABOLIC PANEL: CPT

## 2024-08-13 PROCEDURE — 85027 COMPLETE CBC AUTOMATED: CPT

## 2024-08-13 PROCEDURE — 3078F DIAST BP <80 MM HG: CPT | Performed by: FAMILY MEDICINE

## 2024-08-13 PROCEDURE — 1158F ADVNC CARE PLAN TLK DOCD: CPT | Performed by: FAMILY MEDICINE

## 2024-08-13 PROCEDURE — 3074F SYST BP LT 130 MM HG: CPT | Performed by: FAMILY MEDICINE

## 2024-08-13 ASSESSMENT — ENCOUNTER SYMPTOMS
OCCASIONAL FEELINGS OF UNSTEADINESS: 0
DEPRESSION: 0
LOSS OF SENSATION IN FEET: 0

## 2024-08-13 ASSESSMENT — PATIENT HEALTH QUESTIONNAIRE - PHQ9
SUM OF ALL RESPONSES TO PHQ9 QUESTIONS 1 AND 2: 0
2. FEELING DOWN, DEPRESSED OR HOPELESS: NOT AT ALL
1. LITTLE INTEREST OR PLEASURE IN DOING THINGS: NOT AT ALL

## 2024-08-13 NOTE — PROGRESS NOTES
Subjective   Shoaib Cheng is a 64 y.o. male who is here for a routine exam.  Patient doing well no chest pain or shortness of breath no nausea vomiting diarrhea fever.  No numbness tingling, still having issues with erections.  However he switched his medications.  Active Problem List      Comprehensive Medical/Surgical/Social/Family History  Past Medical History:   Diagnosis Date    Benign prostatic hyperplasia     Type II diabetes mellitus (Multi)      Past Surgical History:   Procedure Laterality Date    EXPOSURE OF IMPACTED TOOTH      TONSILLECTOMY       Social History     Social History Narrative    Not on file         Allergies and Medications  Patient has no known allergies.  Current Outpatient Medications on File Prior to Visit   Medication Sig Dispense Refill    Accu-Chek Guide Me Glucose Mtr misc test once daily      Accu-Chek Guide test strips strip use 1 TEST STRIP to TEST BLOOD SUGAR every morning BEFORE EATING AND AFTER LARGEST MEAL OF THE DAY. Must schedule follow up for further refills 200 strip 0    Accu-Chek Softclix Lancets misc use 1 LANCET to TEST BLOOD SUGAR once daily 30 each 3    tamsulosin (Flomax) 0.4 mg 24 hr capsule take 1 capsule by mouth once daily 30 capsule 2    tirzepatide (Mounjaro) 5 mg/0.5 mL pen injector inject 5 milligram subcutaneously every week 2 mL 1    [DISCONTINUED] metFORMIN (Glucophage) 500 mg tablet Take 2 tablets twice a day (Patient not taking: Reported on 8/13/2024) 360 tablet 3    [DISCONTINUED] semaglutide 0.25 mg or 0.5 mg (2 mg/3 mL) pen injector Inject 0.25 mg under the skin 1 (one) time per week. (Patient not taking: Reported on 8/13/2024) 3 mL 1    [DISCONTINUED] sildenafil (Viagra) 50 mg tablet Take 2 tablets (100 mg) by mouth once daily as needed for erectile dysfunction. 10 tablet 1    [DISCONTINUED] tadalafil (Cialis) 5 mg tablet Take 1 tablet (5 mg) by mouth once daily as needed for erectile dysfunction. (Patient not taking: Reported on 8/13/2024) 30  "tablet 2     No current facility-administered medications on file prior to visit.       New Concerns:    Lifestyle  Diet:  Healthy  Physical Activity: Not on file      Tobacco Use: Low Risk  (8/13/2024)    Patient History     Smoking Tobacco Use: Never     Smokeless Tobacco Use: Never     Passive Exposure: Never      Alcohol Use: Not At Risk (10/17/2023)    AUDIT-C     Frequency of Alcohol Consumption: Never     Average Number of Drinks: Patient does not drink     Frequency of Binge Drinking: Never      Depression: Not at risk (8/13/2024)    PHQ-2     PHQ-2 Score: 0      Stress: Not on file       Consultants:        Review of Systems    Objective   /72 (BP Location: Left arm, Patient Position: Sitting, BP Cuff Size: Adult)   Pulse 67   Temp 36.6 °C (97.9 °F)   Resp 16   Ht 1.822 m (5' 11.75\")   Wt 91.6 kg (202 lb)   SpO2 98%   BMI 27.59 kg/m²     Physical Exam  Vitals and nursing note reviewed.   Constitutional:       General: He is not in acute distress.     Appearance: Normal appearance. He is not ill-appearing.   HENT:      Head: Normocephalic.      Right Ear: Tympanic membrane and ear canal normal.      Left Ear: Tympanic membrane and ear canal normal.      Nose: Nose normal.      Mouth/Throat:      Mouth: Mucous membranes are moist.      Pharynx: Oropharynx is clear.   Eyes:      Extraocular Movements: Extraocular movements intact.      Conjunctiva/sclera: Conjunctivae normal.      Pupils: Pupils are equal, round, and reactive to light.   Cardiovascular:      Rate and Rhythm: Normal rate and regular rhythm.      Pulses: Normal pulses.   Pulmonary:      Effort: Pulmonary effort is normal. No respiratory distress.      Breath sounds: No wheezing, rhonchi or rales.   Abdominal:      General: Bowel sounds are normal.      Palpations: Abdomen is soft.      Tenderness: There is no guarding.      Hernia: No hernia is present.   Musculoskeletal:         General: Normal range of motion.      Cervical back: " Neck supple.      Right lower leg: No edema.      Left lower leg: No edema.   Skin:     General: Skin is warm.      Capillary Refill: Capillary refill takes less than 2 seconds.   Neurological:      General: No focal deficit present.      Mental Status: He is oriented to person, place, and time.      Cranial Nerves: No cranial nerve deficit.      Sensory: No sensory deficit.      Gait: Gait normal.      Deep Tendon Reflexes: Reflexes normal.   Psychiatric:         Mood and Affect: Mood normal.         Behavior: Behavior normal.         Judgment: Judgment normal.         Assessment/Plan   Problem List Items Addressed This Visit    None  Visit Diagnoses       Routine general medical examination at health care facility    -  Primary    Relevant Orders    1 Year Follow Up In [facility] - PCP - Wellness Exam              Reviewed Social Determinants of health with patient, discussed healthy lifestyle including 150 minutes of physical activity per week  Ordered/Reviewed baseline labwork -CBC, CMP, Lipid Panel      Will complete his blood work.  We need to try some medication.  May need to see urology for ED.  May adjust meds  If any chest pain shortness of breath any nausea vomiting fever headache any concerns go to ER  Follow-up in 3 months pending labs

## 2024-08-13 NOTE — PROGRESS NOTES
Subjective   Reason for Visit: Shoaib Cheng is an 64 y.o. male here for a Medicare Wellness visit.               HPI    Patient Care Team:  Glenroy Powell DO as PCP - General (Family Medicine)  Jomar Garcia MD as PCP - [employer] PCP     Review of Systems    Objective   Vitals:  There were no vitals taken for this visit.      Physical Exam    Assessment/Plan   Problem List Items Addressed This Visit    None

## 2024-08-14 DIAGNOSIS — R97.20 ELEVATED PSA: Primary | ICD-10-CM

## 2024-08-14 NOTE — RESULT ENCOUNTER NOTE
Mr. Cheng your hemoglobin A1c was 6.1.  This is still controlled.  Will see how the adjustment of the medications help  Your liver and kidney function appeared fine  Your total cholesterol was 161.  Your LDL was 95.  Would like to improve this some.  Please try to work on your cholesterol diet  Your blood counts were normal no anemia  The protein in your urine appeared fine    However your PSA was 9 which is above normal.  Normal is 0.4-4 .  this is going up.  I believe you saw urology in the past.  I want you to follow back up with them.  I placed a referral.  Please let me know when you are scheduled

## 2024-08-16 DIAGNOSIS — N52.9 ERECTILE DYSFUNCTION, UNSPECIFIED ERECTILE DYSFUNCTION TYPE: ICD-10-CM

## 2024-08-16 DIAGNOSIS — R97.20 ELEVATED PSA: ICD-10-CM

## 2024-08-16 DIAGNOSIS — E11.9 TYPE 2 DIABETES MELLITUS WITHOUT COMPLICATION, WITHOUT LONG-TERM CURRENT USE OF INSULIN (MULTI): ICD-10-CM

## 2024-08-16 RX ORDER — TIRZEPATIDE 5 MG/.5ML
5 INJECTION, SOLUTION SUBCUTANEOUS
Qty: 2 ML | Refills: 1 | Status: SHIPPED | OUTPATIENT
Start: 2024-08-18

## 2024-08-16 RX ORDER — BLOOD SUGAR DIAGNOSTIC
STRIP MISCELLANEOUS
Qty: 200 STRIP | Refills: 0 | Status: SHIPPED | OUTPATIENT
Start: 2024-08-16

## 2024-08-16 RX ORDER — TAMSULOSIN HYDROCHLORIDE 0.4 MG/1
0.4 CAPSULE ORAL DAILY
Qty: 30 CAPSULE | Refills: 2 | Status: SHIPPED | OUTPATIENT
Start: 2024-08-16

## 2024-08-16 RX ORDER — LANCETS
EACH MISCELLANEOUS
Qty: 30 EACH | Refills: 3 | Status: SHIPPED | OUTPATIENT
Start: 2024-08-16

## 2024-08-26 DIAGNOSIS — R97.20 ELEVATED PSA: ICD-10-CM

## 2024-08-26 RX ORDER — TAMSULOSIN HYDROCHLORIDE 0.4 MG/1
0.4 CAPSULE ORAL DAILY
Qty: 30 CAPSULE | Refills: 2 | Status: SHIPPED | OUTPATIENT
Start: 2024-08-26

## 2024-09-23 DIAGNOSIS — E11.9 TYPE 2 DIABETES MELLITUS WITHOUT COMPLICATION, WITHOUT LONG-TERM CURRENT USE OF INSULIN (MULTI): Primary | ICD-10-CM

## 2024-09-23 RX ORDER — TIRZEPATIDE 7.5 MG/.5ML
7.5 INJECTION, SOLUTION SUBCUTANEOUS WEEKLY
Qty: 2 ML | Refills: 2 | Status: SHIPPED | OUTPATIENT
Start: 2024-09-23

## 2024-10-02 ENCOUNTER — LAB (OUTPATIENT)
Dept: LAB | Facility: LAB | Age: 64
End: 2024-10-02
Payer: COMMERCIAL

## 2024-10-02 ENCOUNTER — APPOINTMENT (OUTPATIENT)
Dept: UROLOGY | Facility: CLINIC | Age: 64
End: 2024-10-02
Payer: COMMERCIAL

## 2024-10-02 VITALS
DIASTOLIC BLOOD PRESSURE: 86 MMHG | HEART RATE: 69 BPM | TEMPERATURE: 98.1 F | SYSTOLIC BLOOD PRESSURE: 134 MMHG | BODY MASS INDEX: 27.59 KG/M2 | WEIGHT: 202 LBS

## 2024-10-02 DIAGNOSIS — N40.1 BENIGN PROSTATIC HYPERPLASIA WITH NOCTURIA: ICD-10-CM

## 2024-10-02 DIAGNOSIS — R97.20 ELEVATED PSA: ICD-10-CM

## 2024-10-02 DIAGNOSIS — N52.01 ERECTILE DYSFUNCTION DUE TO ARTERIAL INSUFFICIENCY: ICD-10-CM

## 2024-10-02 DIAGNOSIS — N52.01 ERECTILE DYSFUNCTION DUE TO ARTERIAL INSUFFICIENCY: Primary | ICD-10-CM

## 2024-10-02 DIAGNOSIS — R35.1 BENIGN PROSTATIC HYPERPLASIA WITH NOCTURIA: ICD-10-CM

## 2024-10-02 LAB
CREAT SERPL-MCNC: 0.87 MG/DL (ref 0.5–1.3)
EGFRCR SERPLBLD CKD-EPI 2021: >90 ML/MIN/1.73M*2

## 2024-10-02 PROCEDURE — 3079F DIAST BP 80-89 MM HG: CPT | Performed by: UROLOGY

## 2024-10-02 PROCEDURE — 36415 COLL VENOUS BLD VENIPUNCTURE: CPT

## 2024-10-02 PROCEDURE — 3075F SYST BP GE 130 - 139MM HG: CPT | Performed by: UROLOGY

## 2024-10-02 PROCEDURE — 3062F POS MACROALBUMINURIA REV: CPT | Performed by: UROLOGY

## 2024-10-02 PROCEDURE — 1159F MED LIST DOCD IN RCRD: CPT | Performed by: UROLOGY

## 2024-10-02 PROCEDURE — 3044F HG A1C LEVEL LT 7.0%: CPT | Performed by: UROLOGY

## 2024-10-02 PROCEDURE — 99204 OFFICE O/P NEW MOD 45 MIN: CPT | Performed by: UROLOGY

## 2024-10-02 PROCEDURE — 3048F LDL-C <100 MG/DL: CPT | Performed by: UROLOGY

## 2024-10-02 PROCEDURE — G2211 COMPLEX E/M VISIT ADD ON: HCPCS | Performed by: UROLOGY

## 2024-10-02 PROCEDURE — 82565 ASSAY OF CREATININE: CPT

## 2024-10-02 RX ORDER — SILDENAFIL 100 MG/1
100 TABLET, FILM COATED ORAL AS NEEDED
Qty: 12 TABLET | Refills: 3 | Status: SHIPPED | OUTPATIENT
Start: 2024-10-02 | End: 2024-11-01

## 2024-10-02 RX ORDER — TAMSULOSIN HYDROCHLORIDE 0.4 MG/1
0.4 CAPSULE ORAL DAILY
Qty: 30 CAPSULE | Refills: 2 | Status: SHIPPED | OUTPATIENT
Start: 2024-10-02

## 2024-10-02 NOTE — PROGRESS NOTES
Subjective   Patient ID: Shoaib Cheng is a 64 y.o. male who presents for No chief complaint on file..    HPI  Patient presents for valuation for an elevated PSA.  Most recent value was 9.41.  Patient reports he has had an elevated PSA in the past and has had 2 negative biopsies at the clinic.  Denies any family history of prostate cancer.  Currently has some mild lower urinary tract symptoms.  Reports nocturia x 3.  Reports some slowing of his urinary stream.  Denies any sensation of incomplete emptying.  Denies hematuria or dysuria.  He has been on Flomax for about a year.    Also reports erectile dysfunction.  He has previously tried sildenafil, although he is unsure of the dose.  This did not work for him.  He tried tadalafil 5 mg daily with some mild response.  No other complaints.    PSA 9.41 (previously 6.2 2 years ago)    Active Ambulatory Problems     Diagnosis Date Noted    Blood glucose elevated 08/07/2023    Hyperglycemia 08/07/2023    Elevated PSA 02/22/2013    Type 2 diabetes mellitus without complication, without long-term current use of insulin (Multi) 08/07/2023    Primary osteoarthritis of right knee 08/07/2023    Nodular prostate without lower urinary tract symptoms 11/30/2015    Abscess of left leg 10/17/2023    Cellulitis and abscess of left leg 10/18/2023    Non-pressure chronic ulcer of left lower leg with fat layer exposed (Multi) 01/19/2024     Resolved Ambulatory Problems     Diagnosis Date Noted    No Resolved Ambulatory Problems     Past Medical History:   Diagnosis Date    Benign prostatic hyperplasia     Type II diabetes mellitus (Multi)       Review of Systems  A 12 system review was completed and is negative with the exception of those signs and symptoms noted in the history of present illness.    Objective   Physical Exam  General: in NAD, appears stated age  Head: normocephalic, atraumatic  Neck: supple; trachea is midline  Respiratory: normal effort, no use of accessory  muscles  Cardiovascular: no peripheral edema  Abdomen: soft, nondistended, nontender, no rebound or guarding, no organomegaly, no CVA tenderness, no hernia  Lymphatic: no lymphadenopathy noted  Skin: normal turgor, no rashes  Neurologic: grossly intact, oriented to person/place/time  Psychiatric: mode and affect appropriate  : normal phallus, normal meatus, scrotum normal, testicles normal bilaterally, epididymis normal bilaterally  NATHAN: normal tone, no hemorrhoids, prostate smooth/nontender/no nodules    Assessment/Plan   Problem List Items Addressed This Visit             ICD-10-CM    Elevated PSA R97.20     Other Visit Diagnoses         Codes    Erectile dysfunction due to arterial insufficiency    -  Primary N52.01    Benign prostatic hyperplasia with nocturia     N40.1, R35.1          We discussed his elevated PSA.  I am going to obtain an MRI of the prostate to look for any worrisome lesions.  There is no evidence of malignancy, I think the likelihood of prostate cancer given 2 negative biopsies in the past is small.    We discussed further management of his BPH.  We discussed adding finasteride, or even considering surgical therapy.  We will get a gauge of the size of his prostate with the MRI and then plan care accordingly.    We discussed his erectile dysfunction.  He is not sure that he tried sildenafil at 100 mg.  I will represcribe 100 mg on demand dosing.  We discussed the importance of taking the medication on an empty stomach and without alcohol.  Will see him back after the MRI to plan further care.       Loki Huynh MD 10/02/24 7:49 AM

## 2024-10-25 ENCOUNTER — APPOINTMENT (OUTPATIENT)
Dept: RADIOLOGY | Facility: CLINIC | Age: 64
End: 2024-10-25
Payer: COMMERCIAL

## 2024-10-29 ENCOUNTER — HOSPITAL ENCOUNTER (OUTPATIENT)
Dept: RADIOLOGY | Facility: CLINIC | Age: 64
Discharge: HOME | End: 2024-10-29
Payer: COMMERCIAL

## 2024-10-29 DIAGNOSIS — R97.20 ELEVATED PSA: ICD-10-CM

## 2024-10-29 PROCEDURE — 72197 MRI PELVIS W/O & W/DYE: CPT

## 2024-10-29 PROCEDURE — A9575 INJ GADOTERATE MEGLUMI 0.1ML: HCPCS | Performed by: UROLOGY

## 2024-10-29 PROCEDURE — 2550000001 HC RX 255 CONTRASTS: Performed by: UROLOGY

## 2024-10-29 RX ORDER — GADOTERATE MEGLUMINE 376.9 MG/ML
19 INJECTION INTRAVENOUS
Status: COMPLETED | OUTPATIENT
Start: 2024-10-29 | End: 2024-10-29

## 2024-11-12 ENCOUNTER — APPOINTMENT (OUTPATIENT)
Dept: UROLOGY | Facility: CLINIC | Age: 64
End: 2024-11-12
Payer: COMMERCIAL

## 2024-11-12 VITALS
BODY MASS INDEX: 28 KG/M2 | TEMPERATURE: 98 F | SYSTOLIC BLOOD PRESSURE: 116 MMHG | WEIGHT: 205 LBS | HEART RATE: 67 BPM | DIASTOLIC BLOOD PRESSURE: 76 MMHG

## 2024-11-12 DIAGNOSIS — R97.20 ELEVATED PSA: ICD-10-CM

## 2024-11-12 DIAGNOSIS — N52.01 ERECTILE DYSFUNCTION DUE TO ARTERIAL INSUFFICIENCY: ICD-10-CM

## 2024-11-12 PROCEDURE — 99214 OFFICE O/P EST MOD 30 MIN: CPT | Performed by: UROLOGY

## 2024-11-12 PROCEDURE — 1159F MED LIST DOCD IN RCRD: CPT | Performed by: UROLOGY

## 2024-11-12 PROCEDURE — 3062F POS MACROALBUMINURIA REV: CPT | Performed by: UROLOGY

## 2024-11-12 PROCEDURE — 3078F DIAST BP <80 MM HG: CPT | Performed by: UROLOGY

## 2024-11-12 PROCEDURE — G2211 COMPLEX E/M VISIT ADD ON: HCPCS | Performed by: UROLOGY

## 2024-11-12 PROCEDURE — 3044F HG A1C LEVEL LT 7.0%: CPT | Performed by: UROLOGY

## 2024-11-12 PROCEDURE — 3048F LDL-C <100 MG/DL: CPT | Performed by: UROLOGY

## 2024-11-12 PROCEDURE — 3074F SYST BP LT 130 MM HG: CPT | Performed by: UROLOGY

## 2024-11-12 RX ORDER — TADALAFIL 5 MG/1
5 TABLET ORAL DAILY
Qty: 30 TABLET | Refills: 11 | Status: SHIPPED | OUTPATIENT
Start: 2024-11-12 | End: 2025-11-07

## 2024-11-12 NOTE — PROGRESS NOTES
Subjective   Patient ID: Shoaib Cheng is a 64 y.o. male who presents for mri results. Last seen 10/2/24 when We discussed his elevated PSA.  I am going to obtain an MRI of the prostate to look for any worrisome lesions.  There is no evidence of malignancy, I think the likelihood of prostate cancer given 2 negative biopsies in the past is small. We discussed further management of his BPH.  We discussed adding finasteride, or even considering surgical therapy.  We will get a gauge of the size of his prostate with the MRI and then plan care accordingly. We discussed his erectile dysfunction.  He is not sure that he tried sildenafil at 100 mg.  I will represcribe 100 mg on demand dosing.  We discussed the importance of taking the medication on an empty stomach and without alcohol.  Will see him back after the MRI to plan further care.     HPI  The patient is accompanied by his wife.     Most recent PSA is 9.41.   We discussed the MRI results. The patient's prostate is enlarged at 75 g. The patient has had 2 previous negative biopsies. The patient takes tamsulosin. The patient states he has nocturia 1 time/night usually.     The patient is using sildenafil for ED. He finds that it was not responsive in 1 hour but more like 6 to 8 hours to be effective. He has tried prn tadalafil in the past.     MRI Prostate Results  PROSTATE VOLUME:  The prostate measures 5.9 x 4.5 x 5.4 cm in right-to-left,  anterior-posterior and craniocaudal dimension.  Prostate weight is estimated at 75g. PSA density is 0.13 ng/mL/g.  IMPRESSION:  1.  BPH changes of the transition zone. Diffuse non nodular  hypointensities within the peripheral zone, without evidence of  focally restricted diffusion ( PI-RADS 2).    Review of Systems  A 12 system review was completed and is negative with the exception of those signs and symptoms noted in the history of present illness.    Objective   Physical Exam  General: in NAD, appears stated age  Head:  normocephalic, atraumatic  Respiratory: normal effort, no use of accessory muscles  Cardiovascular: no edema noted  Skin: normal turgor, no rashes  Neurologic: grossly intact, oriented to person/place/time  Psychiatric: mode and affect appropriate     Assessment/Plan   Problem List Items Addressed This Visit             ICD-10-CM    Elevated PSA R97.20    Relevant Orders    Follow Up In Urology    Prostate Specific Antigen     Other Visit Diagnoses         Codes    Erectile dysfunction due to arterial insufficiency     N52.01    Relevant Medications    tadalafil (Cialis) 5 mg tablet          We discussed adding finasteride to relieve BPH symptoms, surgical treatment for bothersome symptoms. After a discussion the patient decided to add tadalafil 5 mg daily and continue taking tamsulosin nightly. Order PSA for 1 year and follow-up with results.    Scribe Attestation  By signing my name below, I, Ana Begum , Lore   attest that this documentation has been prepared under the direction and in the presence of Loki Huynh MD.

## 2024-12-30 DIAGNOSIS — E11.9 TYPE 2 DIABETES MELLITUS WITHOUT COMPLICATION, WITHOUT LONG-TERM CURRENT USE OF INSULIN (MULTI): Primary | ICD-10-CM

## 2024-12-30 RX ORDER — TIRZEPATIDE 10 MG/.5ML
10 INJECTION, SOLUTION SUBCUTANEOUS WEEKLY
Qty: 2 ML | Refills: 1 | Status: SHIPPED | OUTPATIENT
Start: 2024-12-30

## 2025-01-02 ENCOUNTER — APPOINTMENT (OUTPATIENT)
Dept: UROLOGY | Facility: CLINIC | Age: 65
End: 2025-01-02
Payer: COMMERCIAL

## 2025-01-14 DIAGNOSIS — R97.20 ELEVATED PSA: ICD-10-CM

## 2025-01-14 RX ORDER — TAMSULOSIN HYDROCHLORIDE 0.4 MG/1
0.4 CAPSULE ORAL DAILY
Qty: 30 CAPSULE | Refills: 11 | Status: SHIPPED | OUTPATIENT
Start: 2025-01-14

## 2025-02-17 DIAGNOSIS — N52.9 ERECTILE DYSFUNCTION, UNSPECIFIED ERECTILE DYSFUNCTION TYPE: ICD-10-CM

## 2025-02-17 DIAGNOSIS — E11.9 TYPE 2 DIABETES MELLITUS WITHOUT COMPLICATION, WITHOUT LONG-TERM CURRENT USE OF INSULIN (MULTI): ICD-10-CM

## 2025-02-17 RX ORDER — TIRZEPATIDE 10 MG/.5ML
10 INJECTION, SOLUTION SUBCUTANEOUS WEEKLY
Qty: 6 ML | Refills: 1 | Status: SHIPPED | OUTPATIENT
Start: 2025-02-17

## 2025-02-17 RX ORDER — LANCETS
EACH MISCELLANEOUS
Qty: 200 EACH | Refills: 3 | Status: SHIPPED | OUTPATIENT
Start: 2025-02-17

## 2025-02-17 RX ORDER — BLOOD SUGAR DIAGNOSTIC
STRIP MISCELLANEOUS
Qty: 200 STRIP | Refills: 2 | Status: SHIPPED | OUTPATIENT
Start: 2025-02-17

## 2025-08-12 ENCOUNTER — APPOINTMENT (OUTPATIENT)
Dept: PRIMARY CARE | Facility: CLINIC | Age: 65
End: 2025-08-12
Payer: COMMERCIAL

## 2025-08-12 VITALS
OXYGEN SATURATION: 99 % | BODY MASS INDEX: 25.47 KG/M2 | HEIGHT: 72 IN | DIASTOLIC BLOOD PRESSURE: 72 MMHG | RESPIRATION RATE: 16 BRPM | SYSTOLIC BLOOD PRESSURE: 130 MMHG | HEART RATE: 72 BPM | WEIGHT: 188 LBS | TEMPERATURE: 97.6 F

## 2025-08-12 DIAGNOSIS — E11.9 TYPE 2 DIABETES MELLITUS WITHOUT COMPLICATION, WITHOUT LONG-TERM CURRENT USE OF INSULIN: ICD-10-CM

## 2025-08-12 DIAGNOSIS — I83.813 VARICOSE VEINS OF BOTH LOWER EXTREMITIES WITH PAIN: ICD-10-CM

## 2025-08-12 DIAGNOSIS — H91.90 CHANGE IN HEARING, UNSPECIFIED LATERALITY: Primary | ICD-10-CM

## 2025-08-12 PROBLEM — L97.922 NON-PRESSURE CHRONIC ULCER OF LEFT LOWER LEG WITH FAT LAYER EXPOSED (MULTI): Status: RESOLVED | Noted: 2024-01-19 | Resolved: 2025-08-12

## 2025-08-12 LAB — POC HEMOGLOBIN A1C: 5.7 % (ref 4.2–6.5)

## 2025-08-12 PROCEDURE — 3075F SYST BP GE 130 - 139MM HG: CPT | Performed by: FAMILY MEDICINE

## 2025-08-12 PROCEDURE — 1159F MED LIST DOCD IN RCRD: CPT | Performed by: FAMILY MEDICINE

## 2025-08-12 PROCEDURE — 1036F TOBACCO NON-USER: CPT | Performed by: FAMILY MEDICINE

## 2025-08-12 PROCEDURE — 83036 HEMOGLOBIN GLYCOSYLATED A1C: CPT | Performed by: FAMILY MEDICINE

## 2025-08-12 PROCEDURE — G2211 COMPLEX E/M VISIT ADD ON: HCPCS | Performed by: FAMILY MEDICINE

## 2025-08-12 PROCEDURE — 3078F DIAST BP <80 MM HG: CPT | Performed by: FAMILY MEDICINE

## 2025-08-12 PROCEDURE — 99214 OFFICE O/P EST MOD 30 MIN: CPT | Performed by: FAMILY MEDICINE

## 2025-08-12 PROCEDURE — 3044F HG A1C LEVEL LT 7.0%: CPT | Performed by: FAMILY MEDICINE

## 2025-08-12 PROCEDURE — 3008F BODY MASS INDEX DOCD: CPT | Performed by: FAMILY MEDICINE

## 2025-08-12 RX ORDER — TIRZEPATIDE 10 MG/.5ML
10 INJECTION, SOLUTION SUBCUTANEOUS WEEKLY
Qty: 6 ML | Refills: 1 | Status: SHIPPED | OUTPATIENT
Start: 2025-08-12

## 2025-08-12 ASSESSMENT — ENCOUNTER SYMPTOMS
NEUROLOGICAL NEGATIVE: 1
COUGH: 0
FEVER: 0
MUSCULOSKELETAL NEGATIVE: 1

## 2025-09-09 ENCOUNTER — APPOINTMENT (OUTPATIENT)
Dept: PRIMARY CARE | Facility: CLINIC | Age: 65
End: 2025-09-09
Payer: COMMERCIAL

## 2025-09-17 ENCOUNTER — APPOINTMENT (OUTPATIENT)
Dept: PRIMARY CARE | Facility: CLINIC | Age: 65
End: 2025-09-17
Payer: COMMERCIAL

## 2025-11-11 ENCOUNTER — APPOINTMENT (OUTPATIENT)
Dept: UROLOGY | Facility: CLINIC | Age: 65
End: 2025-11-11
Payer: COMMERCIAL

## 2025-11-14 ENCOUNTER — APPOINTMENT (OUTPATIENT)
Dept: UROLOGY | Facility: CLINIC | Age: 65
End: 2025-11-14
Payer: COMMERCIAL